# Patient Record
Sex: MALE | Race: WHITE | NOT HISPANIC OR LATINO | Employment: OTHER | URBAN - NONMETROPOLITAN AREA
[De-identification: names, ages, dates, MRNs, and addresses within clinical notes are randomized per-mention and may not be internally consistent; named-entity substitution may affect disease eponyms.]

---

## 2017-02-11 ENCOUNTER — HOSPITAL ENCOUNTER (EMERGENCY)
Facility: HOSPITAL | Age: 57
Discharge: HOME | End: 2017-02-11
Attending: EMERGENCY MEDICINE | Admitting: EMERGENCY MEDICINE

## 2017-02-11 ENCOUNTER — APPOINTMENT (OUTPATIENT)
Dept: GENERAL RADIOLOGY | Facility: HOSPITAL | Age: 57
End: 2017-02-11

## 2017-02-11 VITALS
RESPIRATION RATE: 18 BRPM | HEART RATE: 101 BPM | BODY MASS INDEX: 37.3 KG/M2 | TEMPERATURE: 98.9 F | HEIGHT: 75 IN | WEIGHT: 300 LBS | DIASTOLIC BLOOD PRESSURE: 65 MMHG | SYSTOLIC BLOOD PRESSURE: 122 MMHG | OXYGEN SATURATION: 88 %

## 2017-02-11 DIAGNOSIS — J44.1 COPD EXACERBATION (HCC): Primary | ICD-10-CM

## 2017-02-11 LAB
ALBUMIN SERPL-MCNC: 4.3 G/DL (ref 3.5–5)
ALBUMIN/GLOB SERPL: 1.3 G/DL (ref 1–2)
ALP SERPL-CCNC: 78 U/L (ref 38–126)
ALT SERPL W P-5'-P-CCNC: 38 U/L (ref 13–69)
ANION GAP SERPL CALCULATED.3IONS-SCNC: 12.2 MMOL/L
AST SERPL-CCNC: 23 U/L (ref 15–46)
BASOPHILS # BLD AUTO: 0.05 10*3/MM3 (ref 0–0.2)
BASOPHILS NFR BLD AUTO: 0.5 % (ref 0–2.5)
BILIRUB SERPL-MCNC: 0.9 MG/DL (ref 0.2–1.3)
BUN BLD-MCNC: 12 MG/DL (ref 7–20)
BUN/CREAT SERPL: 17.1 (ref 6.3–21.9)
CALCIUM SPEC-SCNC: 9.2 MG/DL (ref 8.4–10.2)
CHLORIDE SERPL-SCNC: 96 MMOL/L (ref 98–107)
CO2 SERPL-SCNC: 33 MMOL/L (ref 26–30)
CREAT BLD-MCNC: 0.7 MG/DL (ref 0.6–1.3)
DEPRECATED RDW RBC AUTO: 43 FL (ref 37–54)
EOSINOPHIL # BLD AUTO: 0.16 10*3/MM3 (ref 0–0.7)
EOSINOPHIL NFR BLD AUTO: 1.5 % (ref 0–7)
ERYTHROCYTE [DISTWIDTH] IN BLOOD BY AUTOMATED COUNT: 12.4 % (ref 11.5–14.5)
GFR SERPL CREATININE-BSD FRML MDRD: 116 ML/MIN/1.73
GLOBULIN UR ELPH-MCNC: 3.3 GM/DL
GLUCOSE BLD-MCNC: 125 MG/DL (ref 74–98)
HCT VFR BLD AUTO: 45.9 % (ref 42–52)
HGB BLD-MCNC: 15.7 G/DL (ref 14–18)
IMM GRANULOCYTES # BLD: 0.03 10*3/MM3 (ref 0–0.06)
IMM GRANULOCYTES NFR BLD: 0.3 % (ref 0–0.6)
LYMPHOCYTES # BLD AUTO: 1.05 10*3/MM3 (ref 0.6–3.4)
LYMPHOCYTES NFR BLD AUTO: 9.7 % (ref 10–50)
MCH RBC QN AUTO: 32.4 PG (ref 27–31)
MCHC RBC AUTO-ENTMCNC: 34.2 G/DL (ref 30–37)
MCV RBC AUTO: 94.8 FL (ref 80–94)
MONOCYTES # BLD AUTO: 1.01 10*3/MM3 (ref 0–0.9)
MONOCYTES NFR BLD AUTO: 9.4 % (ref 0–12)
NEUTROPHILS # BLD AUTO: 8.48 10*3/MM3 (ref 2–6.9)
NEUTROPHILS NFR BLD AUTO: 78.6 % (ref 37–80)
NRBC BLD MANUAL-RTO: 0 /100 WBC (ref 0–0)
NT-PROBNP SERPL-MCNC: 143 PG/ML (ref 0–125)
PLATELET # BLD AUTO: 207 10*3/MM3 (ref 130–400)
PMV BLD AUTO: 9.9 FL (ref 6–12)
POTASSIUM BLD-SCNC: 4.2 MMOL/L (ref 3.5–5.1)
PROT SERPL-MCNC: 7.6 G/DL (ref 6.3–8.2)
RBC # BLD AUTO: 4.84 10*6/MM3 (ref 4.7–6.1)
RBC MORPH BLD: NORMAL
SMALL PLATELETS BLD QL SMEAR: ADEQUATE
SODIUM BLD-SCNC: 137 MMOL/L (ref 137–145)
TROPONIN I SERPL-MCNC: 0.01 NG/ML (ref 0–0.05)
TROPONIN I SERPL-MCNC: <0.012 NG/ML (ref 0–0.03)
WBC MORPH BLD: NORMAL
WBC NRBC COR # BLD: 10.78 10*3/MM3 (ref 4.8–10.8)

## 2017-02-11 PROCEDURE — 93005 ELECTROCARDIOGRAM TRACING: CPT | Performed by: EMERGENCY MEDICINE

## 2017-02-11 PROCEDURE — 85007 BL SMEAR W/DIFF WBC COUNT: CPT | Performed by: EMERGENCY MEDICINE

## 2017-02-11 PROCEDURE — 71010 HC CHEST PA OR AP: CPT

## 2017-02-11 PROCEDURE — 80053 COMPREHEN METABOLIC PANEL: CPT | Performed by: EMERGENCY MEDICINE

## 2017-02-11 PROCEDURE — 85025 COMPLETE CBC W/AUTO DIFF WBC: CPT | Performed by: EMERGENCY MEDICINE

## 2017-02-11 PROCEDURE — 94640 AIRWAY INHALATION TREATMENT: CPT

## 2017-02-11 PROCEDURE — 96374 THER/PROPH/DIAG INJ IV PUSH: CPT

## 2017-02-11 PROCEDURE — 36415 COLL VENOUS BLD VENIPUNCTURE: CPT

## 2017-02-11 PROCEDURE — 83880 ASSAY OF NATRIURETIC PEPTIDE: CPT | Performed by: EMERGENCY MEDICINE

## 2017-02-11 PROCEDURE — 25010000002 METHYLPREDNISOLONE PER 125 MG: Performed by: EMERGENCY MEDICINE

## 2017-02-11 PROCEDURE — 84484 ASSAY OF TROPONIN QUANT: CPT | Performed by: EMERGENCY MEDICINE

## 2017-02-11 PROCEDURE — 99284 EMERGENCY DEPT VISIT MOD MDM: CPT

## 2017-02-11 RX ORDER — THEOPHYLLINE 400 MG/1
200 TABLET, EXTENDED RELEASE ORAL 2 TIMES DAILY
Status: ON HOLD | COMMUNITY
End: 2019-12-10

## 2017-02-11 RX ORDER — IPRATROPIUM BROMIDE AND ALBUTEROL SULFATE 2.5; .5 MG/3ML; MG/3ML
3 SOLUTION RESPIRATORY (INHALATION)
Status: COMPLETED | OUTPATIENT
Start: 2017-02-11 | End: 2017-02-11

## 2017-02-11 RX ORDER — ALBUTEROL SULFATE 2.5 MG/3ML
2.5 SOLUTION RESPIRATORY (INHALATION) EVERY 4 HOURS PRN
Qty: 30 VIAL | Refills: 0 | Status: ON HOLD | OUTPATIENT
Start: 2017-02-11 | End: 2019-12-12 | Stop reason: SDUPTHER

## 2017-02-11 RX ORDER — BUDESONIDE AND FORMOTEROL FUMARATE DIHYDRATE 160; 4.5 UG/1; UG/1
2 AEROSOL RESPIRATORY (INHALATION)
Status: ON HOLD | COMMUNITY
End: 2019-12-10

## 2017-02-11 RX ORDER — LEVOFLOXACIN 750 MG/1
750 TABLET ORAL DAILY
Qty: 5 TABLET | Refills: 0 | Status: SHIPPED | OUTPATIENT
Start: 2017-02-11 | End: 2017-02-16

## 2017-02-11 RX ORDER — METHYLPREDNISOLONE SODIUM SUCCINATE 125 MG/2ML
125 INJECTION, POWDER, LYOPHILIZED, FOR SOLUTION INTRAMUSCULAR; INTRAVENOUS ONCE
Status: COMPLETED | OUTPATIENT
Start: 2017-02-11 | End: 2017-02-11

## 2017-02-11 RX ORDER — BENZONATATE 100 MG/1
100 CAPSULE ORAL 3 TIMES DAILY PRN
Qty: 20 CAPSULE | Refills: 0 | Status: ON HOLD | OUTPATIENT
Start: 2017-02-11 | End: 2019-12-10

## 2017-02-11 RX ORDER — LISINOPRIL 20 MG/1
20 TABLET ORAL DAILY
Status: ON HOLD | COMMUNITY
End: 2019-12-10

## 2017-02-11 RX ORDER — BUPRENORPHINE HYDROCHLORIDE AND NALOXONE HYDROCHLORIDE DIHYDRATE 8; 2 MG/1; MG/1
1 TABLET SUBLINGUAL DAILY
Status: ON HOLD | COMMUNITY
End: 2019-12-10

## 2017-02-11 RX ORDER — PREDNISONE 20 MG/1
60 TABLET ORAL DAILY
Qty: 15 TABLET | Refills: 0 | Status: SHIPPED | OUTPATIENT
Start: 2017-02-11 | End: 2017-02-16

## 2017-02-11 RX ORDER — SODIUM CHLORIDE 0.9 % (FLUSH) 0.9 %
10 SYRINGE (ML) INJECTION AS NEEDED
Status: DISCONTINUED | OUTPATIENT
Start: 2017-02-11 | End: 2017-02-12 | Stop reason: HOSPADM

## 2017-02-11 RX ADMIN — IPRATROPIUM BROMIDE AND ALBUTEROL SULFATE 3 ML: .5; 3 SOLUTION RESPIRATORY (INHALATION) at 20:25

## 2017-02-11 RX ADMIN — IPRATROPIUM BROMIDE AND ALBUTEROL SULFATE 3 ML: .5; 3 SOLUTION RESPIRATORY (INHALATION) at 20:14

## 2017-02-11 RX ADMIN — METHYLPREDNISOLONE SODIUM SUCCINATE 125 MG: 125 INJECTION, POWDER, FOR SOLUTION INTRAMUSCULAR; INTRAVENOUS at 20:16

## 2017-02-11 RX ADMIN — IPRATROPIUM BROMIDE AND ALBUTEROL SULFATE 3 ML: .5; 3 SOLUTION RESPIRATORY (INHALATION) at 20:45

## 2017-02-12 LAB
HOLD SPECIMEN: NORMAL
HOLD SPECIMEN: NORMAL
WHOLE BLOOD HOLD SPECIMEN: NORMAL
WHOLE BLOOD HOLD SPECIMEN: NORMAL

## 2017-02-12 NOTE — ED PROVIDER NOTES
Subjective   HPI Comments: 57-year-old male presenting with shortness of breath.  He states that for the last 3 days he has had shortness of breath and chest tightness.  This is been associated with wheezing.  He has also been coughing, this is been productive of brown tinged sputum.  He denies any fevers, chills, nausea, vomiting, diarrhea, abdominal pain.  He continues to smoke.  He states his COPD flares up about this time every year.      Review of Systems   Constitutional: Negative for chills and fever.   HENT: Negative for congestion, rhinorrhea and sore throat.    Eyes: Negative for pain.   Respiratory: Positive for cough, chest tightness and shortness of breath.    Cardiovascular: Negative for chest pain, palpitations and leg swelling.   Gastrointestinal: Negative for abdominal pain, diarrhea, nausea and vomiting.   Genitourinary: Negative for dysuria.   Musculoskeletal: Negative for arthralgias.   Skin: Negative for rash.   Neurological: Negative for weakness and numbness.   Psychiatric/Behavioral: Negative for behavioral problems.       Past Medical History   Diagnosis Date   • Arthritis    • COPD (chronic obstructive pulmonary disease)    • Hypertension    • skin cancer    • Sleep apnea        Allergies   Allergen Reactions   • Corticosteroids Irritability   • Nsaids Anxiety       Past Surgical History   Procedure Laterality Date   • Adenoidectomy         History reviewed. No pertinent family history.    Social History     Social History   • Marital status:      Spouse name: N/A   • Number of children: N/A   • Years of education: N/A     Social History Main Topics   • Smoking status: Current Every Day Smoker   • Smokeless tobacco: None   • Alcohol use No      Comment: quit 20 years ago   • Drug use: Yes     Special: Marijuana      Comment: quit suboxone 2 years ago   • Sexual activity: Not Asked     Other Topics Concern   • None     Social History Narrative   • None           Objective   Physical  Exam   Constitutional: He is oriented to person, place, and time. He appears well-developed and well-nourished. No distress.   HENT:   Head: Normocephalic and atraumatic.   Right Ear: External ear normal.   Left Ear: External ear normal.   Nose: Nose normal.   Mouth/Throat: Oropharynx is clear and moist.   Eyes: Conjunctivae and EOM are normal. Pupils are equal, round, and reactive to light.   Neck: Normal range of motion. Neck supple.   Cardiovascular: Normal rate, regular rhythm, normal heart sounds and intact distal pulses.    Pulmonary/Chest:   Inspiratory and expiratory wheezes throughout both lung fields, coarse, no increased work of breathing   Abdominal: Soft. Bowel sounds are normal. He exhibits no distension. There is no tenderness. There is no rebound and no guarding.   Musculoskeletal: Normal range of motion. He exhibits no edema, tenderness or deformity.   Neurological: He is alert and oriented to person, place, and time.   Skin: Skin is warm and dry. No rash noted.   Psychiatric: He has a normal mood and affect. His behavior is normal.   Nursing note and vitals reviewed.      Procedures         ED Course  ED Course                  MDM  Number of Diagnoses or Management Options  COPD exacerbation:   Diagnosis management comments: 57-year-old male with chest tightness and shortness of breath.  Well-developed, well-nourished obese man in no distress with vital signs as above notable for initial hypoxia on room air.  He is likely having a flareup of his COPD.  We'll treat with nebulizers and steroids.  We'll check labs and chest x-ray.  Disposition pending workup and response to therapy.  -labs  -ekg  -cxr  -iv meds  -nebs    Ddx: copd, pna, bronchitis, acs, chf    EKG: Sinus rhythm, normal rate, normal axis/intervals, no ST changes, unchanged compared to previous    Workup here is unremarkable.  His chest x-ray is clear per my read.  He is feeling much better after treatment here, his lungs are clear.   His oxygen is around 88-94% on room air.  He would like to go home.  We'll discharge him against my recommendations.  Strict return precautions discussed.  He is comfortable with and understanding of the plan.       Amount and/or Complexity of Data Reviewed  Decide to obtain previous medical records or to obtain history from someone other than the patient: yes        Final diagnoses:   COPD exacerbation            Bro Albright MD  02/11/17 [id number]

## 2017-07-24 ENCOUNTER — TRANSCRIBE ORDERS (OUTPATIENT)
Dept: ULTRASOUND IMAGING | Facility: HOSPITAL | Age: 57
End: 2017-07-24

## 2017-07-24 DIAGNOSIS — I10 ESSENTIAL HYPERTENSION: Primary | ICD-10-CM

## 2017-07-24 DIAGNOSIS — G60.9 NEUROPATHY, PERIPHERAL, IDIOPATHIC: ICD-10-CM

## 2017-07-24 DIAGNOSIS — R60.0 BILATERAL LOWER EXTREMITY EDEMA: ICD-10-CM

## 2017-07-26 ENCOUNTER — HOSPITAL ENCOUNTER (OUTPATIENT)
Dept: ULTRASOUND IMAGING | Facility: HOSPITAL | Age: 57
Discharge: HOME | End: 2017-07-26
Admitting: NURSE PRACTITIONER

## 2017-07-26 DIAGNOSIS — I10 ESSENTIAL HYPERTENSION: ICD-10-CM

## 2017-07-26 DIAGNOSIS — R60.0 BILATERAL LOWER EXTREMITY EDEMA: ICD-10-CM

## 2017-07-26 DIAGNOSIS — G60.9 NEUROPATHY, PERIPHERAL, IDIOPATHIC: ICD-10-CM

## 2017-07-26 PROCEDURE — 93923 UPR/LXTR ART STDY 3+ LVLS: CPT

## 2017-11-09 ENCOUNTER — HOSPITAL ENCOUNTER (OUTPATIENT)
Dept: GENERAL RADIOLOGY | Facility: HOSPITAL | Age: 57
Discharge: HOME | End: 2017-11-09

## 2017-11-09 ENCOUNTER — HOSPITAL ENCOUNTER (OUTPATIENT)
Dept: GENERAL RADIOLOGY | Facility: HOSPITAL | Age: 57
Discharge: HOME | End: 2017-11-09
Admitting: NURSE PRACTITIONER

## 2017-11-09 ENCOUNTER — TRANSCRIBE ORDERS (OUTPATIENT)
Dept: GENERAL RADIOLOGY | Facility: HOSPITAL | Age: 57
End: 2017-11-09

## 2017-11-09 DIAGNOSIS — R52 PAIN: ICD-10-CM

## 2017-11-09 DIAGNOSIS — R52 PAIN: Primary | ICD-10-CM

## 2017-11-09 PROCEDURE — 73523 X-RAY EXAM HIPS BI 5/> VIEWS: CPT

## 2017-11-09 PROCEDURE — 72202 X-RAY EXAM SI JOINTS 3/> VWS: CPT

## 2017-11-09 PROCEDURE — 72100 X-RAY EXAM L-S SPINE 2/3 VWS: CPT

## 2018-05-15 ENCOUNTER — HOSPITAL ENCOUNTER (OUTPATIENT)
Dept: GENERAL RADIOLOGY | Facility: HOSPITAL | Age: 58
Discharge: HOME | End: 2018-05-15
Admitting: NURSE PRACTITIONER

## 2018-05-15 ENCOUNTER — HOSPITAL ENCOUNTER (OUTPATIENT)
Dept: GENERAL RADIOLOGY | Facility: HOSPITAL | Age: 58
Discharge: HOME | End: 2018-05-15

## 2018-05-15 ENCOUNTER — TRANSCRIBE ORDERS (OUTPATIENT)
Dept: GENERAL RADIOLOGY | Facility: HOSPITAL | Age: 58
End: 2018-05-15

## 2018-05-15 DIAGNOSIS — R52 PAIN: ICD-10-CM

## 2018-05-15 DIAGNOSIS — R52 PAIN: Primary | ICD-10-CM

## 2018-05-15 PROCEDURE — 73562 X-RAY EXAM OF KNEE 3: CPT

## 2018-06-19 ENCOUNTER — TRANSCRIBE ORDERS (OUTPATIENT)
Dept: ADMINISTRATIVE | Facility: HOSPITAL | Age: 58
End: 2018-06-19

## 2018-06-19 DIAGNOSIS — I73.9 INTERMITTENT CLAUDICATION (HCC): Primary | ICD-10-CM

## 2018-06-19 DIAGNOSIS — Z72.0 TOBACCO ABUSE: ICD-10-CM

## 2018-06-25 ENCOUNTER — HOSPITAL ENCOUNTER (OUTPATIENT)
Dept: ULTRASOUND IMAGING | Facility: HOSPITAL | Age: 58
Discharge: HOME | End: 2018-06-25
Admitting: NURSE PRACTITIONER

## 2018-06-25 DIAGNOSIS — I73.9 INTERMITTENT CLAUDICATION (HCC): ICD-10-CM

## 2018-06-25 DIAGNOSIS — Z72.0 TOBACCO ABUSE: ICD-10-CM

## 2018-06-25 PROCEDURE — 93923 UPR/LXTR ART STDY 3+ LVLS: CPT

## 2018-11-20 ENCOUNTER — OFFICE VISIT (OUTPATIENT)
Dept: ORTHOPEDIC SURGERY | Facility: CLINIC | Age: 58
End: 2018-11-20

## 2018-11-20 VITALS — BODY MASS INDEX: 37.42 KG/M2 | HEIGHT: 75 IN | WEIGHT: 301 LBS | RESPIRATION RATE: 18 BRPM

## 2018-11-20 DIAGNOSIS — M25.562 ARTHRALGIA OF KNEE, LEFT: Primary | ICD-10-CM

## 2018-11-20 DIAGNOSIS — M17.10 ARTHRITIS OF KNEE: ICD-10-CM

## 2018-11-20 PROCEDURE — 99204 OFFICE O/P NEW MOD 45 MIN: CPT | Performed by: ORTHOPAEDIC SURGERY

## 2018-11-20 PROCEDURE — 20610 DRAIN/INJ JOINT/BURSA W/O US: CPT | Performed by: ORTHOPAEDIC SURGERY

## 2018-11-20 RX ORDER — TRIAMCINOLONE ACETONIDE 40 MG/ML
40 INJECTION, SUSPENSION INTRA-ARTICULAR; INTRAMUSCULAR
Status: COMPLETED | OUTPATIENT
Start: 2018-11-20 | End: 2018-11-20

## 2018-11-20 RX ORDER — SITAGLIPTIN 100 MG/1
100 TABLET, FILM COATED ORAL DAILY
COMMUNITY
Start: 2018-09-01

## 2018-11-20 RX ORDER — FUROSEMIDE 40 MG/1
40 TABLET ORAL DAILY PRN
COMMUNITY
Start: 2018-11-09 | End: 2022-11-08

## 2018-11-20 RX ORDER — LIDOCAINE HYDROCHLORIDE 10 MG/ML
2 INJECTION, SOLUTION EPIDURAL; INFILTRATION; INTRACAUDAL; PERINEURAL
Status: COMPLETED | OUTPATIENT
Start: 2018-11-20 | End: 2018-11-20

## 2018-11-20 RX ORDER — GABAPENTIN 600 MG/1
800 TABLET ORAL 3 TIMES DAILY
COMMUNITY
Start: 2018-11-13 | End: 2022-11-08 | Stop reason: SDUPTHER

## 2018-11-20 RX ORDER — BUPRENORPHINE HYDROCHLORIDE, NALOXONE HYDROCHLORIDE 8; 2 MG/1; MG/1
FILM, SOLUBLE BUCCAL; SUBLINGUAL
Status: ON HOLD | COMMUNITY
Start: 2018-11-07 | End: 2019-12-10

## 2018-11-20 RX ORDER — TESTOSTERONE CYPIONATE 200 MG/ML
200 INJECTION, SOLUTION INTRAMUSCULAR
COMMUNITY
Start: 2018-09-14 | End: 2019-12-12 | Stop reason: HOSPADM

## 2018-11-20 RX ORDER — HYDROCHLOROTHIAZIDE 25 MG/1
25 TABLET ORAL DAILY
COMMUNITY
Start: 2018-10-16

## 2018-11-20 RX ORDER — POTASSIUM CHLORIDE 750 MG/1
10 CAPSULE, EXTENDED RELEASE ORAL DAILY PRN
COMMUNITY
Start: 2018-11-09

## 2018-11-20 RX ADMIN — LIDOCAINE HYDROCHLORIDE 2 ML: 10 INJECTION, SOLUTION EPIDURAL; INFILTRATION; INTRACAUDAL; PERINEURAL at 16:03

## 2018-11-20 RX ADMIN — TRIAMCINOLONE ACETONIDE 40 MG: 40 INJECTION, SUSPENSION INTRA-ARTICULAR; INTRAMUSCULAR at 16:03

## 2018-11-20 NOTE — PROGRESS NOTES
Subjective   Patient ID: Stephane Jensen is a 58 y.o. 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 His pain is 8/10 in the left knee and 4/10 in the right.) and Pain of the Right Knee             History of Present Illness  58-year-old currently retired former  with bilateral knee pain left worse than right denies acute trauma, pain is worse in the medial side of the left and right knees with weightbearing twisting standing bending, feels worse pain at the end of the day after standing for long periods of time.   Recently diagnosed with type 2 diabetes has history of COPD and poor venous circulation of both lower legs which is chronic.  Denies recent calf pain increase in size of his ankles or associated back or hip pain.  Has history of undergoing therapy in the past with minimal improvement.  Denies injection to either knee in the recent past.  Denies history of prior surgery either knee.      Review of Systems   Constitutional: Negative for fever.   HENT: Negative for voice change.    Eyes: Negative for visual disturbance.   Respiratory: Negative for shortness of breath.    Cardiovascular: Negative for chest pain.   Gastrointestinal: Negative for abdominal distention and abdominal pain.   Genitourinary: Negative for dysuria.   Musculoskeletal: Positive for arthralgias. Negative for gait problem and joint swelling.   Skin: Negative for rash.   Neurological: Negative for speech difficulty.   Hematological: Does not bruise/bleed easily.   Psychiatric/Behavioral: Negative for confusion.       Past Medical History:   Diagnosis Date   • Arthritis    • COPD (chronic obstructive pulmonary disease) (CMS/HCC)    • Hypertension    • skin cancer    • Sleep apnea         Past Surgical History:   Procedure Laterality Date   •  ADENOIDECTOMY         History reviewed. No pertinent family history.    Social History     Socioeconomic History   • Marital status:      Spouse name: Not on file   • Number of children: Not on file   • Years of education: Not on file   • Highest education level: Not on file   Social Needs   • Financial resource strain: Not on file   • Food insecurity - worry: Not on file   • Food insecurity - inability: Not on file   • Transportation needs - medical: Not on file   • Transportation needs - non-medical: Not on file   Occupational History   • Not on file   Tobacco Use   • Smoking status: Current Every Day Smoker   • Smokeless tobacco: Never Used   Substance and Sexual Activity   • Alcohol use: No     Comment: quit 20 years ago   • Drug use: Yes     Types: Marijuana     Comment: quit suboxone 2 years ago   • Sexual activity: Defer   Other Topics Concern   • Not on file   Social History Narrative   • Not on file       I have reviewed all of the above social hx, family hx, surgical hx, medications, allergies & ROS and confirm that it is accurate.    Allergies   Allergen Reactions   • Corticosteroids Irritability   • Nsaids Anxiety         Current Outpatient Medications:   •  albuterol (PROVENTIL) (2.5 MG/3ML) 0.083% nebulizer solution, Take 2.5 mg by nebulization Every 4 (Four) Hours As Needed for wheezing., Disp: 30 vial, Rfl: 0  •  benzonatate (TESSALON) 100 MG capsule, Take 1 capsule by mouth 3 (Three) Times a Day As Needed for cough., Disp: 20 capsule, Rfl: 0  •  BREO ELLIPTA 200-25 MCG/INH aerosol powder  inhaler, , Disp: , Rfl:   •  budesonide-formoterol (SYMBICORT) 160-4.5 MCG/ACT inhaler, Inhale 2 puffs 2 (Two) Times a Day., Disp: , Rfl:   •  buprenorphine-naloxone (SUBOXONE) 8-2 MG per SL tablet, Place 1 tablet under the tongue Daily., Disp: , Rfl:   •  furosemide (LASIX) 40 MG tablet, , Disp: , Rfl:   •  gabapentin (NEURONTIN) 600 MG tablet, , Disp: , Rfl:   •  hydrochlorothiazide (HYDRODIURIL) 25 MG  "tablet, , Disp: , Rfl:   •  INCRUSE ELLIPTA 62.5 MCG/INH aerosol powder , , Disp: , Rfl:   •  JANUVIA 100 MG tablet, , Disp: , Rfl:   •  lisinopril (PRINIVIL,ZESTRIL) 20 MG tablet, Take 20 mg by mouth Daily., Disp: , Rfl:   •  potassium chloride (MICRO-K) 10 MEQ CR capsule, , Disp: , Rfl:   •  PROAIR  (90 Base) MCG/ACT inhaler, , Disp: , Rfl:   •  sertraline (ZOLOFT) 50 MG tablet, , Disp: , Rfl:   •  SUBOXONE 8-2 MG film film, , Disp: , Rfl:   •  Testosterone Cypionate (DEPOTESTOTERONE CYPIONATE) 200 MG/ML injection, , Disp: , Rfl:   •  theophylline (UNIPHYL) 400 MG 24 hr tablet, Take 200 mg by mouth 2 (Two) Times a Day., Disp: , Rfl:   •  tiotropium (SPIRIVA) 18 MCG per inhalation capsule, Place 1 capsule into inhaler and inhale Daily., Disp: , Rfl:     Objective   Resp 18   Ht 189.2 cm (74.5\")   Wt (!) 137 kg (301 lb)   BMI 38.13 kg/m²    Physical Exam  Constitutional: Patient is oriented to person, place, and time. Patient appears well-developed and well-nourished.   HENT:Head: Normocephalic and atraumatic.   Eyes: EOM are normal. Pupils are equal, round, and reactive to light.   Neck: Normal range of motion. Neck supple.   Cardiovascular: Normal rate.    Pulmonary/Chest: Effort normal and breath sounds normal.   Abdominal: Soft.   Neurological: Patient is alert and oriented to person, place, and time.   Skin: Skin is warm and dry.   Psychiatric: Patient has a normal mood and affect.   Nursing note and vitals reviewed.       [unfilled]   Left knee: 1-2+ effusion, loss of extension 8°, flexion 110, good stability, chronic venous stasis changes in the left lower ankle no calf tenderness Homans sign negative extensor mechanism intact normal patellofemoral crepitus positive pain to medial and lateral joint line.    Right knee: No effusion full extension flexion 1:30 good stability chronic venous stasis changes in the right lower ankle with no calf tenderness Homans sign negative extensor mechanism " intact.    Assessment/Plan   Review of Radiographic Studies:    Indication to evaluate joint condition, no comparison views available, shows evident chronic advanced osteoarthritis.      Large Joint Arthrocentesis: L knee  Date/Time: 11/20/2018 4:03 PM  Consent given by: patient  Site marked: site marked  Timeout: Immediately prior to procedure a time out was called to verify the correct patient, procedure, equipment, support staff and site/side marked as required   Supporting Documentation  Indications: pain   Procedure Details  Location: knee - L knee  Preparation: Patient was prepped and draped in the usual sterile fashion  Needle size: 22 G  Medications administered: 2 mL lidocaine PF 1% 1 %; 40 mg triamcinolone acetonide 40 MG/ML  Patient tolerance: patient tolerated the procedure well with no immediate complications           Stephane was seen today for pain and pain.    Diagnoses and all orders for this visit:    Arthralgia of knee, left  -     XR Knee 1 or 2 View Left       Physical therapy referral given      Recommendations/Plan:   Exercise, medications, injections, other patient advice, and return appointment as noted.    Patient agreeable to call or return sooner for any concerns.       Discussed with him options of treatment for arthritis ranging from nonsurgical to surgical treatment options risks and complications pros and cons of each, also explained the nature of steroid injections versus gel injections with risks complications pros cons were discussed and explained      Impression:  Left worse than right tricompartmental osteoarthritis, history of diabetes, history of COPD, BMI 38  Plan:  Weight loss program, home exercise, if pain relief left knee not satisfactory return for repeat gel series injection to either or both knees depending on symptoms

## 2019-03-14 ENCOUNTER — OFFICE VISIT (OUTPATIENT)
Dept: ORTHOPEDIC SURGERY | Facility: CLINIC | Age: 59
End: 2019-03-14

## 2019-03-14 VITALS — RESPIRATION RATE: 18 BRPM | WEIGHT: 301 LBS | BODY MASS INDEX: 37.42 KG/M2 | HEIGHT: 75 IN

## 2019-03-14 DIAGNOSIS — M17.10 ARTHRITIS OF KNEE: ICD-10-CM

## 2019-03-14 DIAGNOSIS — M25.562 ARTHRALGIA OF KNEE, LEFT: Primary | ICD-10-CM

## 2019-03-14 PROCEDURE — 20610 DRAIN/INJ JOINT/BURSA W/O US: CPT | Performed by: ORTHOPAEDIC SURGERY

## 2019-03-14 NOTE — PROGRESS NOTES
"Subjective   Stephane Jensen is a 59 y.o. male here today for injection therapy.    Chief Complaint   Patient presents with   • Left Knee - Follow-up, Pain     Patient is here today for bilateral knee pain, he states the left is worse and the right hurts a lot. His pain level is 7/10 for the left and 6/10 for the right. He wants to discuss the gel injections.   • Right Knee - Follow-up, Pain       Past Medical History:   Diagnosis Date   • Arthritis    • COPD (chronic obstructive pulmonary disease) (CMS/HCC)    • Hypertension    • skin cancer    • Sleep apnea         Past Surgical History:   Procedure Laterality Date   • ADENOIDECTOMY         Allergies   Allergen Reactions   • Corticosteroids Irritability   • Nsaids Anxiety       Objective   Resp 18   Ht 189.2 cm (74.5\")   Wt (!) 137 kg (301 lb)   BMI 38.13 kg/m²    Physical Exam  Skin exam stable with no erythema, ecchymosis or rash. No new swelling. No motor or sensory changes. Distal pulse intact.    Large Joint Arthrocentesis: R knee  Date/Time: 3/14/2019 3:01 PM  Consent given by: patient  Site marked: site marked  Timeout: Immediately prior to procedure a time out was called to verify the correct patient, procedure, equipment, support staff and site/side marked as required   Supporting Documentation  Indications: pain   Procedure Details  Location: knee - R knee  Preparation: Patient was prepped and draped in the usual sterile fashion  Medications administered: 25 mg sodium hyaluronate 25 MG/2.5ML  Patient tolerance: patient tolerated the procedure well with no immediate complications    Large Joint Arthrocentesis: L knee  Date/Time: 3/14/2019 3:02 PM  Consent given by: patient  Site marked: site marked  Timeout: Immediately prior to procedure a time out was called to verify the correct patient, procedure, equipment, support staff and site/side marked as required   Supporting Documentation  Indications: pain   Procedure Details  Location: knee - L " knee  Preparation: Patient was prepped and draped in the usual sterile fashion  Medications administered: 25 mg sodium hyaluronate 25 MG/2.5ML  Patient tolerance: patient tolerated the procedure well with no immediate complications          Assessment/Plan     No diagnosis found.    No complications of injection noted.    Discussion of orthopaedic goals and activities and patient and/or guardian expressed appreciation. Call or notify for any adverse effect from injection therapy. Ice, heat, and/or modalities as beneficial. Watch for signs and symptoms of infection.    Recommendations:  Work/Activity Status: May perform usual activities as tolerated. May return to routine exercise and physical work as tolerated. No strenuous activity.  Patient and/or guardian is encouraged to call or return for any issues or concerns.    No Follow-up on file.  Patient agreeable to call or return sooner for any concerns.

## 2019-03-14 NOTE — PROGRESS NOTES
Subjective   Patient ID: Stephane Jensen is a 59 y.o. male  Follow-up and Pain of the Left Knee (Patient is here today for bilateral knee pain, he states the left is worse and the right hurts a lot. His pain level is 7/10 for the left and 6/10 for the right. He wants to discuss the gel injections.) and Follow-up and Pain of the Right Knee             History of Present Illness        Review of Systems   Constitutional: Negative for fever.   HENT: Negative for voice change.    Eyes: Negative for visual disturbance.   Respiratory: Negative for shortness of breath.    Cardiovascular: Negative for chest pain.   Gastrointestinal: Negative for abdominal distention and abdominal pain.   Genitourinary: Negative for dysuria.   Musculoskeletal: Positive for arthralgias. Negative for gait problem and joint swelling.   Skin: Negative for rash.   Neurological: Negative for speech difficulty.   Hematological: Does not bruise/bleed easily.   Psychiatric/Behavioral: Negative for confusion.       Past Medical History:   Diagnosis Date   • Arthritis    • COPD (chronic obstructive pulmonary disease) (CMS/[facility])    • Hypertension    • skin cancer    • Sleep apnea         Past Surgical History:   Procedure Laterality Date   • ADENOIDECTOMY         History reviewed. No pertinent family history.    Social History     Socioeconomic History   • Marital status:      Spouse name: Not on file   • Number of children: Not on file   • Years of education: Not on file   • Highest education level: Not on file   Social Needs   • Financial resource strain: Not on file   • Food insecurity - worry: Not on file   • Food insecurity - inability: Not on file   • Transportation needs - medical: Not on file   • Transportation needs - non-medical: Not on file   Occupational History   • Not on file   Tobacco Use   • Smoking status: Current Every Day Smoker   • Smokeless tobacco: Never Used   Substance and Sexual Activity   • Alcohol use: No     Comment:  "quit 20 years ago   • Drug use: Yes     Types: Marijuana     Comment: quit suboxone 2 years ago   • Sexual activity: Defer   Other Topics Concern   • Not on file   Social History Narrative   • Not on file       {Hx REVIEW:82332::\"I have reviewed all of the above social hx, family hx, surgical hx, medications, allergies & ROS and confirm that it is accurate.\"}    Allergies   Allergen Reactions   • Corticosteroids Irritability   • Nsaids Anxiety         Current Outpatient Medications:   •  albuterol (PROVENTIL) (2.5 MG/3ML) 0.083% nebulizer solution, Take 2.5 mg by nebulization Every 4 (Four) Hours As Needed for wheezing., Disp: 30 vial, Rfl: 0  •  benzonatate (TESSALON) 100 MG capsule, Take 1 capsule by mouth 3 (Three) Times a Day As Needed for cough., Disp: 20 capsule, Rfl: 0  •  BREO ELLIPTA 200-25 MCG/INH aerosol powder  inhaler, , Disp: , Rfl:   •  budesonide-formoterol (SYMBICORT) 160-4.5 MCG/ACT inhaler, Inhale 2 puffs 2 (Two) Times a Day., Disp: , Rfl:   •  buprenorphine-naloxone (SUBOXONE) 8-2 MG per SL tablet, Place 1 tablet under the tongue Daily., Disp: , Rfl:   •  furosemide (LASIX) 40 MG tablet, , Disp: , Rfl:   •  gabapentin (NEURONTIN) 600 MG tablet, , Disp: , Rfl:   •  hydrochlorothiazide (HYDRODIURIL) 25 MG tablet, , Disp: , Rfl:   •  INCRUSE ELLIPTA 62.5 MCG/INH aerosol powder , , Disp: , Rfl:   •  JANUVIA 100 MG tablet, , Disp: , Rfl:   •  lisinopril (PRINIVIL,ZESTRIL) 20 MG tablet, Take 20 mg by mouth Daily., Disp: , Rfl:   •  potassium chloride (MICRO-K) 10 MEQ CR capsule, , Disp: , Rfl:   •  PROAIR  (90 Base) MCG/ACT inhaler, , Disp: , Rfl:   •  sertraline (ZOLOFT) 50 MG tablet, , Disp: , Rfl:   •  SUBOXONE 8-2 MG film film, , Disp: , Rfl:   •  Testosterone Cypionate (DEPOTESTOTERONE CYPIONATE) 200 MG/ML injection, , Disp: , Rfl:   •  theophylline (UNIPHYL) 400 MG 24 hr tablet, Take 200 mg by mouth 2 (Two) Times a Day., Disp: , Rfl:   •  tiotropium (SPIRIVA) 18 MCG per inhalation capsule, " "Place 1 capsule into inhaler and inhale Daily., Disp: , Rfl:     Objective   Resp 18   Ht 189.2 cm (74.5\")   Wt (!) 137 kg (301 lb)   BMI 38.13 kg/m²    Physical Exam  Constitutional: Patient is oriented to person, place, and time. Patient appears well-developed and well-nourished.   HENT:Head: Normocephalic and atraumatic.   Eyes: EOM are normal. Pupils are equal, round, and reactive to light.   Neck: Normal range of motion. Neck supple.   Cardiovascular: Normal rate.    Pulmonary/Chest: Effort normal and breath sounds normal.   Abdominal: Soft.   Neurological: Patient is alert and oriented to person, place, and time.   Skin: Skin is warm and dry.   Psychiatric: Patient has a normal mood and affect.   Nursing note and vitals reviewed.       [unfilled]       Assessment/Plan   Review of Radiographic Studies:    {RN Imagin::\"No new imaging done today.\"}      Procedures     There are no diagnoses linked to this encounter.   {16RN Patient Advice:34033::\"Physical therapy referral given\"}      Recommendations/Plan:   {16RN Recommendations/Plan:11801}    Patient agreeable to call or return sooner for any concerns.             Impression:    Plan:    "

## 2019-03-21 ENCOUNTER — OFFICE VISIT (OUTPATIENT)
Dept: ORTHOPEDIC SURGERY | Facility: CLINIC | Age: 59
End: 2019-03-21

## 2019-03-21 VITALS — HEIGHT: 75 IN | WEIGHT: 301 LBS | BODY MASS INDEX: 37.42 KG/M2 | RESPIRATION RATE: 18 BRPM

## 2019-03-21 DIAGNOSIS — M25.561 ARTHRALGIA OF RIGHT KNEE: ICD-10-CM

## 2019-03-21 DIAGNOSIS — M25.562 ARTHRALGIA OF KNEE, LEFT: Primary | ICD-10-CM

## 2019-03-21 DIAGNOSIS — M17.10 ARTHRITIS OF KNEE: ICD-10-CM

## 2019-03-21 PROCEDURE — 20610 DRAIN/INJ JOINT/BURSA W/O US: CPT | Performed by: ORTHOPAEDIC SURGERY

## 2019-03-21 RX ORDER — GLIPIZIDE 5 MG/1
5 TABLET ORAL
COMMUNITY
Start: 2019-03-14

## 2019-03-21 NOTE — PROGRESS NOTES
"Subjective   Stephane Jensen is a 59 y.o. male here today for injection therapy.    Chief Complaint   Patient presents with   • Left Knee - Follow-up, Pain     Patient is here for supartz #2.   • Right Knee - Follow-up, Pain       Past Medical History:   Diagnosis Date   • Arthritis    • COPD (chronic obstructive pulmonary disease) (CMS/HCC)    • Hypertension    • skin cancer    • Sleep apnea         Past Surgical History:   Procedure Laterality Date   • ADENOIDECTOMY         Allergies   Allergen Reactions   • Corticosteroids Irritability   • Nsaids Anxiety       Objective   Resp 18   Ht 189.2 cm (74.5\")   Wt (!) 137 kg (301 lb)   BMI 38.13 kg/m²    Physical Exam  Skin exam stable with no erythema, ecchymosis or rash. No new swelling. No motor or sensory changes. Distal pulse intact.    Large Joint Arthrocentesis: R knee  Date/Time: 3/21/2019 10:23 AM  Consent given by: patient  Site marked: site marked  Timeout: Immediately prior to procedure a time out was called to verify the correct patient, procedure, equipment, support staff and site/side marked as required   Supporting Documentation  Indications: pain   Procedure Details  Location: knee - R knee  Preparation: Patient was prepped and draped in the usual sterile fashion  Needle size: 22 G  Medications administered: 25 mg sodium hyaluronate 25 MG/2.5ML  Patient tolerance: patient tolerated the procedure well with no immediate complications    Large Joint Arthrocentesis: L knee  Date/Time: 3/21/2019 10:24 AM  Consent given by: patient  Site marked: site marked  Timeout: Immediately prior to procedure a time out was called to verify the correct patient, procedure, equipment, support staff and site/side marked as required   Supporting Documentation  Indications: pain   Procedure Details  Location: knee - L knee  Preparation: Patient was prepped and draped in the usual sterile fashion  Needle size: 22 G  Medications administered: 25 mg sodium hyaluronate 25 " MG/2.5ML  Patient tolerance: patient tolerated the procedure well with no immediate complications          Assessment/Plan     No diagnosis found.    No complications of injection noted.    Discussion of orthopaedic goals and activities and patient and/or guardian expressed appreciation. Call or notify for any adverse effect from injection therapy. Ice, heat, and/or modalities as beneficial. Watch for signs and symptoms of infection.    Recommendations:  Work/Activity Status: May perform usual activities as tolerated. May return to routine exercise and physical work as tolerated. No strenuous activity.  Patient and/or guardian is encouraged to call or return for any issues or concerns.    No Follow-up on file.  Patient agreeable to call or return sooner for any concerns.

## 2019-03-28 ENCOUNTER — OFFICE VISIT (OUTPATIENT)
Dept: ORTHOPEDIC SURGERY | Facility: CLINIC | Age: 59
End: 2019-03-28

## 2019-03-28 VITALS — BODY MASS INDEX: 37.42 KG/M2 | RESPIRATION RATE: 18 BRPM | WEIGHT: 301 LBS | HEIGHT: 75 IN

## 2019-03-28 DIAGNOSIS — M17.10 ARTHRITIS OF KNEE: ICD-10-CM

## 2019-03-28 DIAGNOSIS — M25.561 ARTHRALGIA OF RIGHT KNEE: ICD-10-CM

## 2019-03-28 DIAGNOSIS — M25.562 ARTHRALGIA OF KNEE, LEFT: Primary | ICD-10-CM

## 2019-03-28 PROCEDURE — 20610 DRAIN/INJ JOINT/BURSA W/O US: CPT | Performed by: ORTHOPAEDIC SURGERY

## 2019-03-28 NOTE — PROGRESS NOTES
"Subjective   Stephane Jensen is a 59 y.o. male here today for injection therapy.    Chief Complaint   Patient presents with   • Left Knee - Follow-up, Pain     Patient is here today for his 3rd supartz injections.   • Right Knee - Follow-up, Pain       Past Medical History:   Diagnosis Date   • Arthritis    • COPD (chronic obstructive pulmonary disease) (CMS/HCC)    • Hypertension    • skin cancer    • Sleep apnea         Past Surgical History:   Procedure Laterality Date   • ADENOIDECTOMY         Allergies   Allergen Reactions   • Corticosteroids Irritability   • Nsaids Anxiety       Objective   Resp 18   Ht 189.2 cm (74.5\")   Wt (!) 137 kg (301 lb)   BMI 38.13 kg/m²    Physical Exam  Skin exam stable with no erythema, ecchymosis or rash. No new swelling. No motor or sensory changes. Distal pulse intact.    Large Joint Arthrocentesis: R knee  Date/Time: 3/28/2019 2:51 PM  Consent given by: patient  Site marked: site marked  Timeout: Immediately prior to procedure a time out was called to verify the correct patient, procedure, equipment, support staff and site/side marked as required   Supporting Documentation  Indications: pain   Procedure Details  Location: knee - R knee  Preparation: Patient was prepped and draped in the usual sterile fashion  Needle size: 22 G  Medications administered: 25 mg sodium hyaluronate 25 MG/2.5ML  Patient tolerance: patient tolerated the procedure well with no immediate complications    Large Joint Arthrocentesis: L knee  Date/Time: 3/28/2019 2:52 PM  Consent given by: patient  Site marked: site marked  Timeout: Immediately prior to procedure a time out was called to verify the correct patient, procedure, equipment, support staff and site/side marked as required   Supporting Documentation  Indications: pain   Procedure Details  Location: knee - L knee  Preparation: Patient was prepped and draped in the usual sterile fashion  Needle size: 22 G  Medications administered: 25 mg sodium " hyaluronate 25 MG/2.5ML  Patient tolerance: patient tolerated the procedure well with no immediate complications          Assessment/Plan      Diagnosis Plan   1. Arthralgia of knee, left     2. Arthralgia of right knee         No complications of injection noted.    Discussion of orthopaedic goals and activities and patient and/or guardian expressed appreciation. Call or notify for any adverse effect from injection therapy. Ice, heat, and/or modalities as beneficial. Watch for signs and symptoms of infection.    Recommendations:  Work/Activity Status: May perform usual activities as tolerated. May return to routine exercise and physical work as tolerated. No strenuous activity.  Patient and/or guardian is encouraged to call or return for any issues or concerns.    No Follow-up on file.  Patient agreeable to call or return sooner for any concerns.

## 2019-04-04 ENCOUNTER — OFFICE VISIT (OUTPATIENT)
Dept: ORTHOPEDIC SURGERY | Facility: CLINIC | Age: 59
End: 2019-04-04

## 2019-04-04 VITALS — RESPIRATION RATE: 18 BRPM | BODY MASS INDEX: 37.42 KG/M2 | HEIGHT: 75 IN | WEIGHT: 301 LBS

## 2019-04-04 DIAGNOSIS — M25.562 ARTHRALGIA OF KNEE, LEFT: Primary | ICD-10-CM

## 2019-04-04 DIAGNOSIS — M17.10 ARTHRITIS OF KNEE: ICD-10-CM

## 2019-04-04 DIAGNOSIS — M25.561 ARTHRALGIA OF RIGHT KNEE: ICD-10-CM

## 2019-04-04 PROCEDURE — 20610 DRAIN/INJ JOINT/BURSA W/O US: CPT | Performed by: ORTHOPAEDIC SURGERY

## 2019-04-04 NOTE — PROGRESS NOTES
"Subjective   Stephane Jensen is a 59 y.o. male here today for injection therapy.    Chief Complaint   Patient presents with   • Left Knee - Follow-up, Pain     Patient is here today for his 4th supartz injection.   • Right Knee - Follow-up, Pain       Past Medical History:   Diagnosis Date   • Arthritis    • COPD (chronic obstructive pulmonary disease) (CMS/HCC)    • Hypertension    • skin cancer    • Sleep apnea         Past Surgical History:   Procedure Laterality Date   • ADENOIDECTOMY         Allergies   Allergen Reactions   • Corticosteroids Irritability   • Nsaids Anxiety       Objective   Resp 18   Ht 189.2 cm (74.5\")   Wt (!) 137 kg (301 lb)   BMI 38.13 kg/m²    Physical Exam  Skin exam stable with no erythema, ecchymosis or rash. No new swelling. No motor or sensory changes. Distal pulse intact.    Large Joint Arthrocentesis: R knee  Date/Time: 4/4/2019 2:14 PM  Consent given by: patient  Site marked: site marked  Timeout: Immediately prior to procedure a time out was called to verify the correct patient, procedure, equipment, support staff and site/side marked as required   Supporting Documentation  Indications: pain   Procedure Details  Location: knee - R knee  Preparation: Patient was prepped and draped in the usual sterile fashion  Needle size: 22 G  Medications administered: 25 mg sodium hyaluronate 25 MG/2.5ML  Patient tolerance: patient tolerated the procedure well with no immediate complications    Large Joint Arthrocentesis: L knee  Date/Time: 4/4/2019 2:15 PM  Consent given by: patient  Site marked: site marked  Timeout: Immediately prior to procedure a time out was called to verify the correct patient, procedure, equipment, support staff and site/side marked as required   Supporting Documentation  Indications: pain   Procedure Details  Location: knee - L knee  Preparation: Patient was prepped and draped in the usual sterile fashion  Needle size: 22 G  Medications administered: 25 mg sodium " hyaluronate 25 MG/2.5ML  Patient tolerance: patient tolerated the procedure well with no immediate complications          Assessment/Plan      Diagnosis Plan   1. Arthralgia of knee, left     2. Arthralgia of right knee         No complications of injection noted.    Discussion of orthopaedic goals and activities and patient and/or guardian expressed appreciation. Call or notify for any adverse effect from injection therapy. Ice, heat, and/or modalities as beneficial. Watch for signs and symptoms of infection.    Recommendations:  Work/Activity Status: May perform usual activities as tolerated. May return to routine exercise and physical work as tolerated. No strenuous activity.  Patient and/or guardian is encouraged to call or return for any issues or concerns.    No Follow-up on file.  Patient agreeable to call or return sooner for any concerns.

## 2019-04-15 ENCOUNTER — OFFICE VISIT (OUTPATIENT)
Dept: ORTHOPEDIC SURGERY | Facility: CLINIC | Age: 59
End: 2019-04-15

## 2019-04-15 VITALS — WEIGHT: 288 LBS | BODY MASS INDEX: 35.81 KG/M2 | HEIGHT: 75 IN | RESPIRATION RATE: 18 BRPM

## 2019-04-15 DIAGNOSIS — M25.562 ARTHRALGIA OF KNEE, LEFT: Primary | ICD-10-CM

## 2019-04-15 DIAGNOSIS — M17.10 ARTHRITIS OF KNEE: ICD-10-CM

## 2019-04-15 DIAGNOSIS — M25.561 ARTHRALGIA OF RIGHT KNEE: ICD-10-CM

## 2019-04-15 PROCEDURE — 20610 DRAIN/INJ JOINT/BURSA W/O US: CPT | Performed by: ORTHOPAEDIC SURGERY

## 2019-04-15 RX ORDER — DIAZEPAM 10 MG/1
10 TABLET ORAL 2 TIMES DAILY
Refills: 0 | COMMUNITY
Start: 2019-04-12 | End: 2021-01-21

## 2019-04-15 RX ORDER — SERTRALINE HYDROCHLORIDE 100 MG/1
100 TABLET, FILM COATED ORAL DAILY
COMMUNITY
Start: 2019-04-12

## 2019-04-15 NOTE — PROGRESS NOTES
"Subjective   Stephane Jensen is a 59 y.o. male here today for injection therapy.    Chief Complaint   Patient presents with   • Left Knee - Follow-up, Pain     Patient is here today for his last supartz injection.   • Right Knee - Follow-up, Pain       Past Medical History:   Diagnosis Date   • Arthritis    • COPD (chronic obstructive pulmonary disease) (CMS/HCC)    • Hypertension    • skin cancer    • Sleep apnea         Past Surgical History:   Procedure Laterality Date   • ADENOIDECTOMY         Allergies   Allergen Reactions   • Corticosteroids Irritability   • Nsaids Anxiety       Objective   Resp 18   Ht 189.2 cm (74.5\")   Wt 131 kg (288 lb)   BMI 36.48 kg/m²    Physical Exam  Skin exam stable with no erythema, ecchymosis or rash. No new swelling. No motor or sensory changes. Distal pulse intact.    Large Joint Arthrocentesis: R knee  Date/Time: 4/15/2019 3:57 PM  Consent given by: patient  Site marked: site marked  Timeout: Immediately prior to procedure a time out was called to verify the correct patient, procedure, equipment, support staff and site/side marked as required   Supporting Documentation  Indications: pain   Procedure Details  Location: knee - R knee  Preparation: Patient was prepped and draped in the usual sterile fashion  Needle size: 22 G  Medications administered: 25 mg sodium hyaluronate 25 MG/2.5ML  Patient tolerance: patient tolerated the procedure well with no immediate complications    Large Joint Arthrocentesis: L knee  Date/Time: 4/15/2019 3:58 PM  Consent given by: patient  Site marked: site marked  Timeout: Immediately prior to procedure a time out was called to verify the correct patient, procedure, equipment, support staff and site/side marked as required   Supporting Documentation  Indications: pain   Procedure Details  Location: knee - L knee  Preparation: Patient was prepped and draped in the usual sterile fashion  Needle size: 22 G  Medications administered: 25 mg sodium " hyaluronate 25 MG/2.5ML  Patient tolerance: patient tolerated the procedure well with no immediate complications          Assessment/Plan      Diagnosis Plan   1. Arthralgia of knee, left  Large Joint Arthrocentesis: R knee    Large Joint Arthrocentesis: L knee   2. Arthralgia of right knee     3. Arthritis of knee         No complications of injection noted.    Discussion of orthopaedic goals and activities and patient and/or guardian expressed appreciation. Call or notify for any adverse effect from injection therapy. Ice, heat, and/or modalities as beneficial. Watch for signs and symptoms of infection.    Recommendations:  Work/Activity Status: May perform usual activities as tolerated. May return to routine exercise and physical work as tolerated. No strenuous activity.  Patient and/or guardian is encouraged to call or return for any issues or concerns.    No Follow-up on file.  Patient agreeable to call or return sooner for any concerns.

## 2019-12-10 ENCOUNTER — APPOINTMENT (OUTPATIENT)
Dept: GENERAL RADIOLOGY | Facility: HOSPITAL | Age: 59
End: 2019-12-10

## 2019-12-10 ENCOUNTER — APPOINTMENT (OUTPATIENT)
Dept: CARDIOLOGY | Facility: HOSPITAL | Age: 59
End: 2019-12-10

## 2019-12-10 ENCOUNTER — HOSPITAL ENCOUNTER (INPATIENT)
Facility: HOSPITAL | Age: 59
LOS: 1 days | Discharge: HOME | End: 2019-12-12
Attending: EMERGENCY MEDICINE | Admitting: FAMILY MEDICINE

## 2019-12-10 DIAGNOSIS — R09.02 HYPOXIA: ICD-10-CM

## 2019-12-10 DIAGNOSIS — G47.33 OSA AND COPD OVERLAP SYNDROME (HCC): Chronic | ICD-10-CM

## 2019-12-10 DIAGNOSIS — J44.9 OSA AND COPD OVERLAP SYNDROME (HCC): Chronic | ICD-10-CM

## 2019-12-10 DIAGNOSIS — J44.9 CHRONIC OBSTRUCTIVE PULMONARY DISEASE, UNSPECIFIED COPD TYPE (HCC): ICD-10-CM

## 2019-12-10 DIAGNOSIS — J44.1 COPD WITH ACUTE EXACERBATION (HCC): Primary | ICD-10-CM

## 2019-12-10 PROBLEM — D75.1 SECONDARY POLYCYTHEMIA: Status: ACTIVE | Noted: 2019-12-10

## 2019-12-10 PROBLEM — I10 ESSENTIAL HYPERTENSION: Status: ACTIVE | Noted: 2019-12-10

## 2019-12-10 PROBLEM — E11.9 TYPE 2 DIABETES MELLITUS, WITHOUT LONG-TERM CURRENT USE OF INSULIN (HCC): Status: ACTIVE | Noted: 2019-12-10

## 2019-12-10 LAB
A-A DO2: 25.2 MMHG
ALBUMIN SERPL-MCNC: 4.5 G/DL (ref 3.5–5.2)
ALBUMIN/GLOB SERPL: 1.4 G/DL
ALP SERPL-CCNC: 81 U/L (ref 39–117)
ALT SERPL W P-5'-P-CCNC: 13 U/L (ref 1–41)
ANION GAP SERPL CALCULATED.3IONS-SCNC: 12.6 MMOL/L (ref 5–15)
ARTERIAL PATENCY WRIST A: POSITIVE
AST SERPL-CCNC: 14 U/L (ref 1–40)
ATMOSPHERIC PRESS: 738 MMHG
BASE EXCESS BLDA CALC-SCNC: 7.6 MMOL/L (ref 0–2)
BASOPHILS # BLD AUTO: 0.09 10*3/MM3 (ref 0–0.2)
BASOPHILS NFR BLD AUTO: 1 % (ref 0–1.5)
BDY SITE: ABNORMAL
BILIRUB SERPL-MCNC: 0.9 MG/DL (ref 0.2–1.2)
BUN BLD-MCNC: 7 MG/DL (ref 8–23)
BUN/CREAT SERPL: 8.5 (ref 7–25)
CALCIUM SPEC-SCNC: 9.7 MG/DL (ref 8.6–10.5)
CHLORIDE SERPL-SCNC: 93 MMOL/L (ref 98–107)
CO2 SERPL-SCNC: 29.4 MMOL/L (ref 22–29)
COHGB MFR BLD: 3.9 % (ref 0–2)
CREAT BLD-MCNC: 0.82 MG/DL (ref 0.76–1.27)
DEPRECATED RDW RBC AUTO: 46.3 FL (ref 37–54)
EOSINOPHIL # BLD AUTO: 0.1 10*3/MM3 (ref 0–0.4)
EOSINOPHIL NFR BLD AUTO: 1.1 % (ref 0.3–6.2)
ERYTHROCYTE [DISTWIDTH] IN BLOOD BY AUTOMATED COUNT: 12.9 % (ref 12.3–15.4)
GAS FLOW AIRWAY: 2 LPM
GFR SERPL CREATININE-BSD FRML MDRD: 96 ML/MIN/1.73
GLOBULIN UR ELPH-MCNC: 3.2 GM/DL
GLUCOSE BLD-MCNC: 154 MG/DL (ref 65–99)
GLUCOSE BLDC GLUCOMTR-MCNC: 181 MG/DL (ref 70–130)
GLUCOSE BLDC GLUCOMTR-MCNC: 255 MG/DL (ref 70–130)
HCO3 BLDA-SCNC: 34.3 MMOL/L (ref 22–28)
HCT VFR BLD AUTO: 56.5 % (ref 37.5–51)
HCT VFR BLD CALC: 61.2 %
HGB BLD-MCNC: 20.1 G/DL (ref 13–17.7)
HGB BLDA-MCNC: 20 G/DL (ref 12–18)
HOLD SPECIMEN: NORMAL
HOLD SPECIMEN: NORMAL
IMM GRANULOCYTES # BLD AUTO: 0.03 10*3/MM3 (ref 0–0.05)
IMM GRANULOCYTES NFR BLD AUTO: 0.3 % (ref 0–0.5)
LYMPHOCYTES # BLD AUTO: 1.76 10*3/MM3 (ref 0.7–3.1)
LYMPHOCYTES NFR BLD AUTO: 20 % (ref 19.6–45.3)
MAGNESIUM SERPL-MCNC: 1.8 MG/DL (ref 1.6–2.4)
MCH RBC QN AUTO: 34.4 PG (ref 26.6–33)
MCHC RBC AUTO-ENTMCNC: 35.6 G/DL (ref 31.5–35.7)
MCV RBC AUTO: 96.6 FL (ref 79–97)
METHGB BLD QL: 0.6 % (ref 0–1.5)
MODALITY: ABNORMAL
MONOCYTES # BLD AUTO: 0.86 10*3/MM3 (ref 0.1–0.9)
MONOCYTES NFR BLD AUTO: 9.8 % (ref 5–12)
NEUTROPHILS # BLD AUTO: 5.96 10*3/MM3 (ref 1.7–7)
NEUTROPHILS NFR BLD AUTO: 67.8 % (ref 42.7–76)
NOTE: ABNORMAL
NRBC BLD AUTO-RTO: 0 /100 WBC (ref 0–0.2)
NT-PROBNP SERPL-MCNC: 105.1 PG/ML (ref 5–900)
OXYHGB MFR BLDV: 88 % (ref 94–99)
PCO2 BLDA: 51.7 MM HG (ref 35–45)
PCO2 TEMP ADJ BLD: ABNORMAL MM[HG]
PH BLDA: 7.43 PH UNITS (ref 7.3–7.5)
PH, TEMP CORRECTED: ABNORMAL
PLATELET # BLD AUTO: 200 10*3/MM3 (ref 140–450)
PMV BLD AUTO: 9.6 FL (ref 6–12)
PO2 BLDA: 61.6 MM HG (ref 75–100)
PO2 TEMP ADJ BLD: ABNORMAL MM[HG]
POTASSIUM BLD-SCNC: 3.4 MMOL/L (ref 3.5–5.2)
PROCALCITONIN SERPL-MCNC: 0.04 NG/ML (ref 0.1–0.25)
PROT SERPL-MCNC: 7.7 G/DL (ref 6–8.5)
RBC # BLD AUTO: 5.85 10*6/MM3 (ref 4.14–5.8)
SAO2 % BLDCOA: 92.1 % (ref 94–100)
SODIUM BLD-SCNC: 135 MMOL/L (ref 136–145)
TROPONIN T SERPL-MCNC: <0.01 NG/ML (ref 0–0.03)
VENTILATOR MODE: ABNORMAL
WBC NRBC COR # BLD: 8.8 10*3/MM3 (ref 3.4–10.8)
WHOLE BLOOD HOLD SPECIMEN: NORMAL
WHOLE BLOOD HOLD SPECIMEN: NORMAL

## 2019-12-10 PROCEDURE — 94640 AIRWAY INHALATION TREATMENT: CPT

## 2019-12-10 PROCEDURE — 83880 ASSAY OF NATRIURETIC PEPTIDE: CPT

## 2019-12-10 PROCEDURE — 83735 ASSAY OF MAGNESIUM: CPT | Performed by: PHYSICIAN ASSISTANT

## 2019-12-10 PROCEDURE — 99284 EMERGENCY DEPT VISIT MOD MDM: CPT

## 2019-12-10 PROCEDURE — 82375 ASSAY CARBOXYHB QUANT: CPT

## 2019-12-10 PROCEDURE — 87070 CULTURE OTHR SPECIMN AEROBIC: CPT | Performed by: FAMILY MEDICINE

## 2019-12-10 PROCEDURE — 84484 ASSAY OF TROPONIN QUANT: CPT

## 2019-12-10 PROCEDURE — G0378 HOSPITAL OBSERVATION PER HR: HCPCS

## 2019-12-10 PROCEDURE — 25010000002 MAGNESIUM SULFATE 2 GM/50ML SOLUTION: Performed by: PHYSICIAN ASSISTANT

## 2019-12-10 PROCEDURE — 82805 BLOOD GASES W/O2 SATURATION: CPT

## 2019-12-10 PROCEDURE — 36600 WITHDRAWAL OF ARTERIAL BLOOD: CPT

## 2019-12-10 PROCEDURE — 94799 UNLISTED PULMONARY SVC/PX: CPT

## 2019-12-10 PROCEDURE — 85025 COMPLETE CBC W/AUTO DIFF WBC: CPT

## 2019-12-10 PROCEDURE — 25010000002 ENOXAPARIN PER 10 MG: Performed by: FAMILY MEDICINE

## 2019-12-10 PROCEDURE — 93306 TTE W/DOPPLER COMPLETE: CPT

## 2019-12-10 PROCEDURE — 63710000001 INSULIN ASPART PER 5 UNITS: Performed by: FAMILY MEDICINE

## 2019-12-10 PROCEDURE — 93005 ELECTROCARDIOGRAM TRACING: CPT

## 2019-12-10 PROCEDURE — 84145 PROCALCITONIN (PCT): CPT | Performed by: PHYSICIAN ASSISTANT

## 2019-12-10 PROCEDURE — 99222 1ST HOSP IP/OBS MODERATE 55: CPT | Performed by: FAMILY MEDICINE

## 2019-12-10 PROCEDURE — 25010000002 METHYLPREDNISOLONE PER 125 MG: Performed by: PHYSICIAN ASSISTANT

## 2019-12-10 PROCEDURE — 83050 HGB METHEMOGLOBIN QUAN: CPT

## 2019-12-10 PROCEDURE — 71046 X-RAY EXAM CHEST 2 VIEWS: CPT

## 2019-12-10 PROCEDURE — 80053 COMPREHEN METABOLIC PANEL: CPT

## 2019-12-10 PROCEDURE — 25010000002 METHYLPREDNISOLONE PER 125 MG: Performed by: FAMILY MEDICINE

## 2019-12-10 PROCEDURE — 82962 GLUCOSE BLOOD TEST: CPT

## 2019-12-10 RX ORDER — GUAIFENESIN 600 MG/1
600 TABLET, EXTENDED RELEASE ORAL EVERY 12 HOURS SCHEDULED
Status: DISCONTINUED | OUTPATIENT
Start: 2019-12-10 | End: 2019-12-12 | Stop reason: HOSPADM

## 2019-12-10 RX ORDER — BENZONATATE 100 MG/1
200 CAPSULE ORAL 3 TIMES DAILY PRN
Status: DISCONTINUED | OUTPATIENT
Start: 2019-12-10 | End: 2019-12-12 | Stop reason: HOSPADM

## 2019-12-10 RX ORDER — LISINOPRIL 10 MG/1
10 TABLET ORAL
Status: DISCONTINUED | OUTPATIENT
Start: 2019-12-10 | End: 2019-12-12 | Stop reason: HOSPADM

## 2019-12-10 RX ORDER — NICOTINE 21 MG/24HR
1 PATCH, TRANSDERMAL 24 HOURS TRANSDERMAL EVERY 24 HOURS
Status: DISCONTINUED | OUTPATIENT
Start: 2019-12-10 | End: 2019-12-12 | Stop reason: HOSPADM

## 2019-12-10 RX ORDER — METHYLPREDNISOLONE SODIUM SUCCINATE 125 MG/2ML
60 INJECTION, POWDER, LYOPHILIZED, FOR SOLUTION INTRAMUSCULAR; INTRAVENOUS EVERY 12 HOURS
Status: DISCONTINUED | OUTPATIENT
Start: 2019-12-10 | End: 2019-12-11

## 2019-12-10 RX ORDER — HYDROCHLOROTHIAZIDE 25 MG/1
25 TABLET ORAL DAILY
Status: DISCONTINUED | OUTPATIENT
Start: 2019-12-10 | End: 2019-12-12 | Stop reason: HOSPADM

## 2019-12-10 RX ORDER — METHYLPREDNISOLONE SODIUM SUCCINATE 125 MG/2ML
125 INJECTION, POWDER, LYOPHILIZED, FOR SOLUTION INTRAMUSCULAR; INTRAVENOUS ONCE
Status: COMPLETED | OUTPATIENT
Start: 2019-12-10 | End: 2019-12-10

## 2019-12-10 RX ORDER — SODIUM CHLORIDE FOR INHALATION 3 %
4 VIAL, NEBULIZER (ML) INHALATION ONCE
Status: COMPLETED | OUTPATIENT
Start: 2019-12-10 | End: 2019-12-10

## 2019-12-10 RX ORDER — DIAZEPAM 5 MG/1
10 TABLET ORAL 2 TIMES DAILY
Status: DISCONTINUED | OUTPATIENT
Start: 2019-12-10 | End: 2019-12-12 | Stop reason: HOSPADM

## 2019-12-10 RX ORDER — DOCUSATE SODIUM 100 MG/1
100 CAPSULE, LIQUID FILLED ORAL 2 TIMES DAILY PRN
COMMUNITY
End: 2021-01-21

## 2019-12-10 RX ORDER — GLIPIZIDE 5 MG/1
5 TABLET ORAL
Status: DISCONTINUED | OUTPATIENT
Start: 2019-12-11 | End: 2019-12-12 | Stop reason: HOSPADM

## 2019-12-10 RX ORDER — POTASSIUM CHLORIDE 750 MG/1
10 CAPSULE, EXTENDED RELEASE ORAL DAILY
Status: DISCONTINUED | OUTPATIENT
Start: 2019-12-10 | End: 2019-12-12 | Stop reason: HOSPADM

## 2019-12-10 RX ORDER — BUDESONIDE AND FORMOTEROL FUMARATE DIHYDRATE 160; 4.5 UG/1; UG/1
2 AEROSOL RESPIRATORY (INHALATION)
Status: DISCONTINUED | OUTPATIENT
Start: 2019-12-10 | End: 2019-12-12 | Stop reason: HOSPADM

## 2019-12-10 RX ORDER — NICOTINE POLACRILEX 4 MG
1 LOZENGE BUCCAL
Status: DISCONTINUED | OUTPATIENT
Start: 2019-12-10 | End: 2019-12-12 | Stop reason: HOSPADM

## 2019-12-10 RX ORDER — BUPRENORPHINE AND NALOXONE 8; 2 MG/1; MG/1
1.5 FILM, SOLUBLE BUCCAL; SUBLINGUAL DAILY
Status: DISCONTINUED | OUTPATIENT
Start: 2019-12-10 | End: 2019-12-10 | Stop reason: CLARIF

## 2019-12-10 RX ORDER — DOCUSATE SODIUM 100 MG/1
100 CAPSULE, LIQUID FILLED ORAL 2 TIMES DAILY PRN
Status: DISCONTINUED | OUTPATIENT
Start: 2019-12-10 | End: 2019-12-12 | Stop reason: HOSPADM

## 2019-12-10 RX ORDER — SODIUM CHLORIDE 0.9 % (FLUSH) 0.9 %
10 SYRINGE (ML) INJECTION AS NEEDED
Status: DISCONTINUED | OUTPATIENT
Start: 2019-12-10 | End: 2019-12-12 | Stop reason: HOSPADM

## 2019-12-10 RX ORDER — BUPRENORPHINE AND NALOXONE 4; 1 MG/1; MG/1
1 FILM, SOLUBLE BUCCAL; SUBLINGUAL DAILY
Status: DISCONTINUED | OUTPATIENT
Start: 2019-12-10 | End: 2019-12-12 | Stop reason: HOSPADM

## 2019-12-10 RX ORDER — DEXTROSE MONOHYDRATE 25 G/50ML
25 INJECTION, SOLUTION INTRAVENOUS
Status: DISCONTINUED | OUTPATIENT
Start: 2019-12-10 | End: 2019-12-12 | Stop reason: HOSPADM

## 2019-12-10 RX ORDER — BUPRENORPHINE AND NALOXONE 8; 2 MG/1; MG/1
1 FILM, SOLUBLE BUCCAL; SUBLINGUAL DAILY
Status: DISCONTINUED | OUTPATIENT
Start: 2019-12-10 | End: 2019-12-12 | Stop reason: HOSPADM

## 2019-12-10 RX ORDER — SODIUM CHLORIDE 9 MG/ML
100 INJECTION, SOLUTION INTRAVENOUS CONTINUOUS
Status: DISCONTINUED | OUTPATIENT
Start: 2019-12-10 | End: 2019-12-11

## 2019-12-10 RX ORDER — IPRATROPIUM BROMIDE AND ALBUTEROL SULFATE 2.5; .5 MG/3ML; MG/3ML
3 SOLUTION RESPIRATORY (INHALATION) ONCE
Status: COMPLETED | OUTPATIENT
Start: 2019-12-10 | End: 2019-12-10

## 2019-12-10 RX ORDER — MAGNESIUM SULFATE HEPTAHYDRATE 40 MG/ML
2 INJECTION, SOLUTION INTRAVENOUS ONCE
Status: COMPLETED | OUTPATIENT
Start: 2019-12-10 | End: 2019-12-10

## 2019-12-10 RX ORDER — GABAPENTIN 300 MG/1
600 CAPSULE ORAL EVERY 8 HOURS SCHEDULED
Status: DISCONTINUED | OUTPATIENT
Start: 2019-12-10 | End: 2019-12-12 | Stop reason: HOSPADM

## 2019-12-10 RX ORDER — IPRATROPIUM BROMIDE AND ALBUTEROL SULFATE 2.5; .5 MG/3ML; MG/3ML
3 SOLUTION RESPIRATORY (INHALATION)
Status: DISCONTINUED | OUTPATIENT
Start: 2019-12-10 | End: 2019-12-12 | Stop reason: HOSPADM

## 2019-12-10 RX ORDER — BUPRENORPHINE AND NALOXONE 8; 2 MG/1; MG/1
1 FILM, SOLUBLE BUCCAL; SUBLINGUAL DAILY
COMMUNITY

## 2019-12-10 RX ADMIN — INSULIN ASPART 4 UNITS: 100 INJECTION, SOLUTION INTRAVENOUS; SUBCUTANEOUS at 21:03

## 2019-12-10 RX ADMIN — SODIUM CHLORIDE, PRESERVATIVE FREE 10 ML: 5 INJECTION INTRAVENOUS at 20:01

## 2019-12-10 RX ADMIN — SODIUM CHLORIDE SOLN NEBU 3% 4 ML: 3 NEBU SOLN at 17:00

## 2019-12-10 RX ADMIN — BENZONATATE 200 MG: 100 CAPSULE ORAL at 23:44

## 2019-12-10 RX ADMIN — IPRATROPIUM BROMIDE AND ALBUTEROL SULFATE 3 ML: .5; 3 SOLUTION RESPIRATORY (INHALATION) at 19:43

## 2019-12-10 RX ADMIN — IPRATROPIUM BROMIDE AND ALBUTEROL SULFATE 3 ML: .5; 3 SOLUTION RESPIRATORY (INHALATION) at 12:35

## 2019-12-10 RX ADMIN — INSULIN ASPART 2 UNITS: 100 INJECTION, SOLUTION INTRAVENOUS; SUBCUTANEOUS at 17:25

## 2019-12-10 RX ADMIN — GUAIFENESIN 600 MG: 600 TABLET, EXTENDED RELEASE ORAL at 16:35

## 2019-12-10 RX ADMIN — BUPRENORPHINE HYDROCHLORIDE, NALOXONE HYDROCHLORIDE 1 FILM: 8; 2 FILM, SOLUBLE BUCCAL; SUBLINGUAL at 16:35

## 2019-12-10 RX ADMIN — POTASSIUM CHLORIDE 10 MEQ: 750 CAPSULE, EXTENDED RELEASE ORAL at 16:35

## 2019-12-10 RX ADMIN — METHYLPREDNISOLONE SODIUM SUCCINATE 60 MG: 125 INJECTION, POWDER, FOR SOLUTION INTRAMUSCULAR; INTRAVENOUS at 20:01

## 2019-12-10 RX ADMIN — SODIUM CHLORIDE 100 ML/HR: 9 INJECTION, SOLUTION INTRAVENOUS at 16:35

## 2019-12-10 RX ADMIN — IPRATROPIUM BROMIDE AND ALBUTEROL SULFATE 3 ML: .5; 3 SOLUTION RESPIRATORY (INHALATION) at 11:07

## 2019-12-10 RX ADMIN — DOXYCYCLINE 100 MG: 100 INJECTION, POWDER, LYOPHILIZED, FOR SOLUTION INTRAVENOUS at 16:35

## 2019-12-10 RX ADMIN — BUPRENORPHINE HYDROCHLORIDE, NALOXONE HYDROCHLORIDE 1 FILM: 4; 1 FILM, SOLUBLE BUCCAL; SUBLINGUAL at 16:35

## 2019-12-10 RX ADMIN — IPRATROPIUM BROMIDE AND ALBUTEROL SULFATE 3 ML: .5; 3 SOLUTION RESPIRATORY (INHALATION) at 17:00

## 2019-12-10 RX ADMIN — LINAGLIPTIN 5 MG: 5 TABLET, FILM COATED ORAL at 16:35

## 2019-12-10 RX ADMIN — SODIUM CHLORIDE 1000 ML: 9 INJECTION, SOLUTION INTRAVENOUS at 11:55

## 2019-12-10 RX ADMIN — SERTRALINE HYDROCHLORIDE 100 MG: 50 TABLET ORAL at 16:35

## 2019-12-10 RX ADMIN — HYDROCHLOROTHIAZIDE 25 MG: 25 TABLET ORAL at 16:35

## 2019-12-10 RX ADMIN — ENOXAPARIN SODIUM 60 MG: 60 INJECTION SUBCUTANEOUS at 16:35

## 2019-12-10 RX ADMIN — BUDESONIDE AND FORMOTEROL FUMARATE DIHYDRATE 2 PUFF: 160; 4.5 AEROSOL RESPIRATORY (INHALATION) at 19:43

## 2019-12-10 RX ADMIN — DIAZEPAM 10 MG: 5 TABLET ORAL at 20:01

## 2019-12-10 RX ADMIN — IPRATROPIUM BROMIDE AND ALBUTEROL SULFATE 3 ML: .5; 3 SOLUTION RESPIRATORY (INHALATION) at 23:56

## 2019-12-10 RX ADMIN — GABAPENTIN 600 MG: 300 CAPSULE ORAL at 16:35

## 2019-12-10 RX ADMIN — LISINOPRIL 10 MG: 10 TABLET ORAL at 16:35

## 2019-12-10 RX ADMIN — NICOTINE 1 PATCH: 21 PATCH TRANSDERMAL at 16:35

## 2019-12-10 RX ADMIN — METHYLPREDNISOLONE SODIUM SUCCINATE 125 MG: 125 INJECTION, POWDER, FOR SOLUTION INTRAMUSCULAR; INTRAVENOUS at 11:55

## 2019-12-10 RX ADMIN — MAGNESIUM SULFATE HEPTAHYDRATE 2 G: 40 INJECTION, SOLUTION INTRAVENOUS at 13:36

## 2019-12-10 RX ADMIN — GABAPENTIN 600 MG: 300 CAPSULE ORAL at 21:03

## 2019-12-10 NOTE — H&P
[facility]   HISTORY AND PHYSICAL      Name:  Stephane Jensen   Age:  59 y.o.  Sex:  male  :  1960  MRN:  [id number]   Visit Number:  [id number]  Admission Date:  12/10/2019  Date Of Service:  12/10/19  Primary Care Physician:  Leticia Whittaker APRN    Chief Complaint:     Shortness of breath, sputum production    History Of Presenting Illness:      Patient is a 59-year-old gentleman with longstanding history of COPD and tobacco abuse who presents with complaints of shortness of breath and cough.  Patient with medical history of COPD, DOMINIQUE, obesity, hypertension, type 2 diabetes, substance abuse, and ongoing tobacco abuse.  Patient states that he has had shortness of breath for a long time, however over the last week he has had increased sputum production with green color in addition to significant shortness of breath.  He is very winded with minimal exertion.  Continues to smoke at least a pack of cigarettes a day.  He does have a history of substance abuse, he is on Suboxone therapy and follows with psychiatric services.  He denies chest pains or palpitations.  He denies lower extremity swelling.  He states he is on Lasix on a as needed basis.  He denies cardiac history.    In the ER, patient presented hypertensive, tachypneic, and afebrile.  He was satting in the upper 80s on room air and was placed on 2 L nasal cannula.  Procalcitonin 0.04, white blood cell count 8800, hemoglobin of 20, hematocrit 56.5.  Troponin negative.  proBNP 105.  Creatinine 0.82, potassium 3.4 ABG with pH 7.43, PCO2 51, PO2 of 61 on 2 L nasal cannula.  Chest x-ray without evidence of acute cardiopulmonary process, but with chronic interstitial lung changes.  Patient was given 2 nebulizer treatments, magnesium, IV Solu-Medrol, and placed on oxygen therapy.  We were asked admit for further management.    Review Of Systems:     General: Denies any fevers, chills or loss of consciousness.   Psych: No history  of any hallucinations and delusions.  Ophth: No history of any diplopia or transient loss of vision.  ENT: No history of sore throat, nasal congestion or ear pain.   Allergy/immuno: No history of rash or itching.  Hem/lymph: No history of neck swelling or easy bleeding.  Endo: No history of any recent unintentional weight gain or loss.  Resp: Positive cough and shortness of breath  Card: No history of chest pain or palpitations.   GI: No history of nausea, vomiting, diarrhea. Denies any abdominal pain.   : No history of dysuria or hematuria.  MSK: No muscle pain. No calf pain.   Neuro: No history of any focal weakness. No loss of consciousness. Denies any numbness.  Derm:: No history of any redness or pruritis.     Past Medical History:    Past Medical History:   Diagnosis Date   • Arthritis    • COPD (chronic obstructive pulmonary disease) (CMS/HCC)    • Hypertension    • skin cancer    • Sleep apnea        Past Surgical history:    Past Surgical History:   Procedure Laterality Date   • ADENOIDECTOMY         Social History:    Social History     Socioeconomic History   • Marital status:      Spouse name: Not on file   • Number of children: Not on file   • Years of education: Not on file   • Highest education level: Not on file   Tobacco Use   • Smoking status: Current Every Day Smoker   • Smokeless tobacco: Never Used   Substance and Sexual Activity   • Alcohol use: No     Comment: quit 20 years ago   • Drug use: Yes     Types: Marijuana     Comment: quit suboxone 2 years ago   • Sexual activity: Defer       Family History:    History reviewed. No pertinent family history.    Allergies:      Corticosteroids and Nsaids    Home Medications:    Prior to Admission Medications     Prescriptions Last Dose Informant Patient Reported? Taking?    albuterol (PROVENTIL) (2.5 MG/3ML) 0.083% nebulizer solution   No No    Take 2.5 mg by nebulization Every 4 (Four) Hours As Needed for wheezing.    benzonatate (TESSALON)  100 MG capsule   No No    Take 1 capsule by mouth 3 (Three) Times a Day As Needed for cough.    BREO ELLIPTA 200-25 MCG/INH aerosol powder  inhaler   Yes No    budesonide-formoterol (SYMBICORT) 160-4.5 MCG/ACT inhaler   Yes No    Inhale 2 puffs 2 (Two) Times a Day.    buprenorphine-naloxone (SUBOXONE) 8-2 MG per SL tablet   Yes No    Place 1 tablet under the tongue Daily.    diazePAM (VALIUM) 10 MG tablet   Yes No    TK 1/2-1 T PO BID PRN    furosemide (LASIX) 40 MG tablet   Yes No    gabapentin (NEURONTIN) 600 MG tablet   Yes No    glipiZIDE (GLUCOTROL) 5 MG tablet   Yes No    hydrochlorothiazide (HYDRODIURIL) 25 MG tablet   Yes No    INCRUSE ELLIPTA 62.5 MCG/INH aerosol powder    Yes No    JANUVIA 100 MG tablet   Yes No    lisinopril (PRINIVIL,ZESTRIL) 20 MG tablet   Yes No    Take 20 mg by mouth Daily.    potassium chloride (MICRO-K) 10 MEQ CR capsule   Yes No    PROAIR  (90 Base) MCG/ACT inhaler   Yes No    sertraline (ZOLOFT) 100 MG tablet   Yes No    SUBOXONE 8-2 MG film film   Yes No    Testosterone Cypionate (DEPOTESTOTERONE CYPIONATE) 200 MG/ML injection   Yes No    theophylline (UNIPHYL) 400 MG 24 hr tablet   Yes No    Take 200 mg by mouth 2 (Two) Times a Day.    tiotropium (SPIRIVA) 18 MCG per inhalation capsule   Yes No    Place 1 capsule into inhaler and inhale Daily.             ED Medications:    Medications   sodium chloride 0.9 % flush 10 mL (has no administration in time range)   ipratropium-albuterol (DUO-NEB) nebulizer solution 3 mL (3 mL Nebulization Given 12/10/19 1107)   methylPREDNISolone sodium succinate (SOLU-Medrol) injection 125 mg (125 mg Intravenous Given 12/10/19 1155)   sodium chloride 0.9 % bolus 1,000 mL (0 mL Intravenous Stopped 12/10/19 1337)   ipratropium-albuterol (DUO-NEB) nebulizer solution 3 mL (3 mL Nebulization Given 12/10/19 1235)   magnesium sulfate 2g/50 mL (PREMIX) infusion (0 g Intravenous Stopped 12/10/19 1414)       Vital Signs:    Temp:  [97.4 °F (36.3 °C)-98  °F (36.7 °C)] 97.4 °F (36.3 °C)  Heart Rate:  [61-98] 78  Resp:  [18-30] 22  BP: (147-168)/() 168/94        12/10/19  1034 12/10/19  1506   Weight: 125 kg (276 lb) 125 kg (274 lb 9.6 oz)       Body mass index is 34.32 kg/m².    Physical Exam:    General Appearance:  Alert and cooperative, not in any acute distress.   Head:  Atraumatic and normocephalic, without obvious abnormality.   Eyes:          PERRLA, conjunctivae and sclerae normal, no Icterus. No pallor. Extraocular movements are within normal limits.   Ears:  Ears appear intact with no abnormalities noted.   Throat: No oral lesions, no thrush, oral mucosa dry   Neck: Supple, trachea midline, no thyromegaly, no carotid bruit.   Back:   No tenderness to palpation, range of motion normal.   Lungs:    Diminished breath sounds bilaterally with diffuse expiratory wheezes in upper and lower lung zones.  Accessory muscle use noted.   Heart:  Normal S1 and S2, no murmur, no gallop, no rub. No JVD.   Abdomen:   Normal bowel sounds, no masses, no organomegaly. Soft, non-tender, non-distended, no guarding, no rebound tenderness.  Obese abdomen.   Extremities: Moves all extremities well, trace edema, no cyanosis, no clubbing.   Pulses: Pulses palpable and equal bilaterally.   Skin: No bleeding, bruising or rash.   Neurologic: Alert and oriented x 3. Moves all four limbs equally. No tremors. No facial asymmetry.     Laboratory data:    I have reviewed the labs done in the emergency room.    Results from last 7 days   Lab Units 12/10/19  1045   SODIUM mmol/L 135*   POTASSIUM mmol/L 3.4*   CHLORIDE mmol/L 93*   CO2 mmol/L 29.4*   BUN mg/dL 7*   CREATININE mg/dL 0.82   CALCIUM mg/dL 9.7   BILIRUBIN mg/dL 0.9   ALK PHOS U/L 81   ALT (SGPT) U/L 13   AST (SGOT) U/L 14   GLUCOSE mg/dL 154*     Results from last 7 days   Lab Units 12/10/19  1045   WBC 10*3/mm3 8.80   HEMOGLOBIN g/dL 20.1*   HEMATOCRIT % 56.5*   PLATELETS 10*3/mm3 200         Results from last 7 days   Lab  Units 12/10/19  1045   TROPONIN T ng/mL <0.010     Results from last 7 days   Lab Units 12/10/19  1045   PROBNP pg/mL 105.1             Results from last 7 days   Lab Units 12/10/19  1115   PH, ARTERIAL pH units 7.430   PO2 ART mm Hg 61.6*   PCO2, ARTERIAL mm Hg 51.7*   HCO3 ART mmol/L 34.3*           Invalid input(s): USDES,  BLOODU, NITRITITE, BACT, EP  Pain Management Panel     Pain Management Panel Latest Ref Rng & Units 6/20/2016 6/20/2016    AMPHETAMINES SCREEN, URINE NEGATIVE Negative -    BARBITURATES SCREEN NEGATIVE Negative -    BENZODIAZEPINE SCREEN, URINE NEGATIVE PRESUMPTIVE POSITIVE PRESUMPTIVE POSITIVE    COCAINE SCREEN, URINE NEGATIVE Negative -    METHADONE SCREEN, URINE NEGATIVE Negative -              EKG:      EKG was sinus rhythm, heart rate of 72, there are no acute ST or T wave abnormalities.    Radiology:    Imaging Results (Last 72 Hours)     Procedure Component Value Units Date/Time    XR Chest 2 View [[id number]] Collected:  12/10/19 1108     Updated:  12/10/19 1111    Narrative:       PROCEDURE: XR CHEST 2 VW-        HISTORY: [facility] Triage Protocol     COMPARISON: 02/11/2017.     FINDINGS: The heart is normal in size. The mediastinum is unremarkable.  There are chronic interstitial lung changes. No focal opacities were  effusions are evident. There is no pneumothorax. There are no acute  osseous abnormalities.       Impression:       No acute cardiopulmonary process.           This report was finalized on 12/10/2019 11:09 AM by Esteban Briseno M.D..            COPD with acute exacerbation (CMS/HCC)    Secondary polycythemia    Type 2 diabetes mellitus, without long-term current use of insulin (CMS/HCC)    Essential hypertension    DOMINIQUE and COPD overlap syndrome (CMS/HCC)      Assessment/Plan:    DOMINIQUE and COPD overlap syndrome (CMS/HCC)- (present on admission)   Suspect combination is contributing to overall hypoxia and exertional dyspnea.  He would likely need a sleep study at some point.   I will order 2D echo to evaluate right ventricular systolic pressure.    Essential hypertension- (present on admission)  Patient previously on hydrochlorothiazide and he takes Lasix 40 mg on a as needed basis.  Denies any history of cardiac disease.  Will monitor blood pressure.    Type 2 diabetes mellitus, without long-term current use of insulin (CMS/[facility])- (present on admission)   Will restart home medications once confirmed.  Will have sliding scale insulin coverage as anticipate some increase with steroids.  Carbohydrate consistent diet otherwise.    Secondary polycythemia- (present on admission)  Likely related to underlying severe COPD in setting of ongoing tobacco abuse and DOMINIQUE.    COPD with acute exacerbation (CMS/[facility])- (present on admission)   Patient to be admitted to telemetry.  Symptomatology consistent with underlying COPD exacerbation.  With increased sputum production, will add doxycycline in addition to Solu-Medrol, duo nebs, supplemental oxygen.  Patient also has underlying sleep apnea but has failed CPAP in the past due to intolerance, states this was in 2005.  He may be able to tolerate a new CPAP/BiPAP now.  Patient may also require oxygen therapy upon discharge.    Patient will be admitted for observation due to hypoxia in setting of significant COPD exacerbation.  DVT prophylaxis with Lovenox.  Anticipate discharge within 24 to 48 hours depending upon improvement in clinical condition.    Yane Singletary, DO  12/10/19  3:20 PM    Dictated utilizing Dragon dictation.

## 2019-12-10 NOTE — ASSESSMENT & PLAN NOTE
Patient much improved.  Still with mild wheezing.  Was given Depo-Medrol injection today.  Apparently has had issues with taking prednisone in the past.  Advise use of nebulizer treatments in addition to Breo and Incruse started by pulmonology.  He will have follow-up appointments with pulmonology upon discharge.

## 2019-12-10 NOTE — ED NOTES
Raymond Hinds, assigned Room 410/Tele at 1423. PUJA Samay[place], notified.      Xin Wright  12/10/19 1422

## 2019-12-10 NOTE — ASSESSMENT & PLAN NOTE
Suspect combination is contributing to overall hypoxia and exertional dyspnea.  He would likely need a sleep study at some point.  2D echo with evidence of elevated right-sided pressures in addition to likely acute on chronic cor pulmonale.  Cardiology has been consulted.  CT PE ordered without evidence of PE.  Dr. Roman does recommend follow-up with him as he may have underlying CAD which needs to be addressed in the future.

## 2019-12-10 NOTE — PHARMACY RECOMMENDATION
"Pharmacy Consult for Pharmacokinetics and Stewardship of Anticoagulation Drugs     Stephane Jensen is a  59 y.o. male.  Height - 190.5 cm (75\")  Weight - 125 kg (274 lb 9.6 oz)    BMI (Body Mass Index) = 34.3 kg/m²    Pharmacy consulted for dosing of Enoxaparin.  Indication for use: VTE prophylaxis.    Labs:  Results from last 7 days   Lab Units 12/10/19  1045   HEMOGLOBIN g/dL 20.1*   HEMATOCRIT % 56.5*   PLATELETS 10*3/mm3 200   CREATININE mg/dL 0.82   Estimated Creatinine Clearance: 136.9 mL/min (by C-G formula based on SCr of 0.82 mg/dL).      Assessment:  Initiaited Enoxaparin 60 mg subq q 24 hrs for VTE prophylaxis for BMI > 30 kg/m² and weight > 100 kg.    Thank you for the opportunity to consult on this patient.    Victor Hugo Mix, Pharm.D.  12/10/19  4:02 PM  "

## 2019-12-10 NOTE — ED PROVIDER NOTES
"Subjective   58 y/o male that comes in by private vehicle with c/c \"SOA\" x 2-3 days. Patient states that he has had shortness of breath with associated dry non-productive cough for past 2-3 days. Patient does report significant history of COPD. Does state that he has done at home breathing treatments without relief. Denies being on home oxygen. Patient denies any fever, chills, body aches, chest pain, nausea, vomiting. Patient denies any previous cardiac disease, diabetes, hyperlipidemia.       History provided by:  Patient   used: No    Shortness of Breath   Severity:  Moderate  Onset quality:  Sudden  Duration:  3 days  Timing:  Intermittent  Progression:  Worsening  Chronicity:  New  Context: not activity, not animal exposure, not emotional upset, not known allergens, not occupational exposure, not pollens, not strong odors, not URI and not weather changes    Relieved by:  Nothing  Worsened by:  Exertion, movement and coughing  Ineffective treatments:  Inhaler  Associated symptoms: wheezing    Associated symptoms: no abdominal pain, no chest pain, no cough, no diaphoresis, no headaches, no hemoptysis, no rash, no sputum production, no syncope, no swollen glands and no vomiting    Risk factors: tobacco use    Risk factors: no recent alcohol use, no family hx of DVT, no hx of cancer, no hx of PE/DVT and no obesity        Review of Systems   Constitutional: Negative.  Negative for activity change, appetite change, diaphoresis and fatigue.   HENT: Negative.  Negative for congestion, dental problem, drooling and ear discharge.    Eyes: Negative.  Negative for pain, discharge, redness and itching.   Respiratory: Positive for apnea, chest tightness, shortness of breath and wheezing. Negative for cough, hemoptysis and sputum production.    Cardiovascular: Negative for chest pain and syncope.   Gastrointestinal: Negative for abdominal pain and vomiting.   Endocrine: Negative.  Negative for cold " intolerance, heat intolerance, polydipsia and polyphagia.   Genitourinary: Negative.  Negative for difficulty urinating, flank pain and frequency.   Musculoskeletal: Negative.  Negative for arthralgias, gait problem and joint swelling.   Skin: Negative.  Negative for color change, pallor and rash.   Neurological: Negative for headaches.   Hematological: Negative.  Negative for adenopathy.   Psychiatric/Behavioral: Negative.  Negative for agitation, confusion, decreased concentration, dysphoric mood and hallucinations.   All other systems reviewed and are negative.      Past Medical History:   Diagnosis Date   • Arthritis    • COPD (chronic obstructive pulmonary disease) (CMS/HCC)    • Hypertension    • skin cancer    • Sleep apnea        Allergies   Allergen Reactions   • Corticosteroids Irritability   • Nsaids Anxiety       Past Surgical History:   Procedure Laterality Date   • ADENOIDECTOMY         History reviewed. No pertinent family history.    Social History     Socioeconomic History   • Marital status:      Spouse name: Not on file   • Number of children: Not on file   • Years of education: Not on file   • Highest education level: Not on file   Tobacco Use   • Smoking status: Current Every Day Smoker   • Smokeless tobacco: Never Used   Substance and Sexual Activity   • Alcohol use: No     Comment: quit 20 years ago   • Drug use: Yes     Types: Marijuana     Comment: quit suboxone 2 years ago   • Sexual activity: Defer           Objective   Physical Exam   Constitutional: He is oriented to person, place, and time. He appears well-developed and well-nourished.  Non-toxic appearance. He does not appear ill. No distress.   HENT:   Head: Normocephalic and atraumatic.   Mouth/Throat: Oropharynx is clear and moist. No oropharyngeal exudate or posterior oropharyngeal edema.   Eyes: Pupils are equal, round, and reactive to light. EOM are normal.   Neck: Normal range of motion. Neck supple. No hepatojugular  reflux and no JVD present. No tracheal deviation present. No thyromegaly present.   Cardiovascular: Normal rate, regular rhythm and normal heart sounds.  No extrasystoles are present. Exam reveals no gallop, no friction rub and no decreased pulses.   No murmur heard.  Pulmonary/Chest: Accessory muscle usage present. Tachypnea noted. No bradypnea. No respiratory distress. He has decreased breath sounds. He has wheezes in the right upper field, the right lower field, the left upper field, the left middle field and the left lower field. Chest wall is not dull to percussion. He exhibits no mass, no tenderness, no bony tenderness, no laceration, no crepitus, no edema, no deformity, no swelling and no retraction.   Moderate expiratory wheezing throughout lung fields.    Abdominal: Soft. Bowel sounds are normal. He exhibits no distension, no ascites and no mass. There is no splenomegaly or hepatomegaly. There is no tenderness. There is no rebound and no guarding.   Musculoskeletal: Normal range of motion.        Right lower leg: Normal. He exhibits no tenderness and no edema.        Left lower leg: Normal. He exhibits no tenderness and no edema.   Lymphadenopathy:     He has no cervical adenopathy.   Neurological: He is alert and oriented to person, place, and time. He is not disoriented. No cranial nerve deficit.   Skin: Skin is warm and dry. No ecchymosis and no rash noted. He is not diaphoretic. No cyanosis or erythema. No pallor. Nails show no clubbing.   Psychiatric: He has a normal mood and affect. His behavior is normal. His mood appears not anxious. He is not agitated.   Nursing note and vitals reviewed.      Procedures           ED Course  ED Course as of Dec 10 1422   Tue Dec 10, 2019   1054 CBC & Differential(!) [BH]   1055 CBC & Differential(!) [BH]   1129 EKG interpreted by me reveals sinus rhythm with rate of 72 bpm.  There are few nonspecific findings but no obvious acute ST segment or T wave changes.  This  is an atypical appearing EKG.    [TB]   1225 Blood Gas, Arterial With Co-Ox(!) [BH]   1225 No acute cardiopulmonary process.    [BH]   1237 Patient has improved. Will give second neb treatment.     [BH]   1416 Without oxygen NC patient oxygen has dropped to 87-88%. Will call hospitalist for possible admit.     [BH]   1419 Discussed care with Dr. Dodd. Patient will be admitted to telemetry for observation for COPD exacerbation and hypoxia.     [BH]      ED Course User Index  [] David Naylor PA-C  [TB] Geneva Lewis MD                      No data recorded                        MDM    Final diagnoses:   COPD with acute exacerbation (CMS/[facility])   Hypoxia              David Naylor PA-C  12/10/19 0567

## 2019-12-11 ENCOUNTER — APPOINTMENT (OUTPATIENT)
Dept: CT IMAGING | Facility: HOSPITAL | Age: 59
End: 2019-12-11

## 2019-12-11 LAB
ALBUMIN SERPL-MCNC: 4.2 G/DL (ref 3.5–5.2)
ALBUMIN/GLOB SERPL: 1.3 G/DL
ALP SERPL-CCNC: 72 U/L (ref 39–117)
ALT SERPL W P-5'-P-CCNC: 11 U/L (ref 1–41)
ANION GAP SERPL CALCULATED.3IONS-SCNC: 11.8 MMOL/L (ref 5–15)
AST SERPL-CCNC: 12 U/L (ref 1–40)
BASOPHILS # BLD AUTO: 0.04 10*3/MM3 (ref 0–0.2)
BASOPHILS NFR BLD AUTO: 0.3 % (ref 0–1.5)
BILIRUB SERPL-MCNC: 0.4 MG/DL (ref 0.2–1.2)
BUN BLD-MCNC: 8 MG/DL (ref 8–23)
BUN/CREAT SERPL: 12.1 (ref 7–25)
CALCIUM SPEC-SCNC: 8.7 MG/DL (ref 8.6–10.5)
CHLORIDE SERPL-SCNC: 94 MMOL/L (ref 98–107)
CHOLEST SERPL-MCNC: 131 MG/DL (ref 0–200)
CO2 SERPL-SCNC: 28.2 MMOL/L (ref 22–29)
CREAT BLD-MCNC: 0.66 MG/DL (ref 0.76–1.27)
DEPRECATED RDW RBC AUTO: 47.2 FL (ref 37–54)
EOSINOPHIL # BLD AUTO: 0 10*3/MM3 (ref 0–0.4)
EOSINOPHIL NFR BLD AUTO: 0 % (ref 0.3–6.2)
ERYTHROCYTE [DISTWIDTH] IN BLOOD BY AUTOMATED COUNT: 12.8 % (ref 12.3–15.4)
GFR SERPL CREATININE-BSD FRML MDRD: 123 ML/MIN/1.73
GLOBULIN UR ELPH-MCNC: 3.3 GM/DL
GLUCOSE BLD-MCNC: 211 MG/DL (ref 65–99)
GLUCOSE BLDC GLUCOMTR-MCNC: 130 MG/DL (ref 70–130)
GLUCOSE BLDC GLUCOMTR-MCNC: 154 MG/DL (ref 70–130)
GLUCOSE BLDC GLUCOMTR-MCNC: 189 MG/DL (ref 70–130)
GLUCOSE BLDC GLUCOMTR-MCNC: 222 MG/DL (ref 70–130)
HBA1C MFR BLD: 6.7 % (ref 4.8–5.6)
HCT VFR BLD AUTO: 55.2 % (ref 37.5–51)
HDLC SERPL-MCNC: 32 MG/DL (ref 40–60)
HGB BLD-MCNC: 19.1 G/DL (ref 13–17.7)
IMM GRANULOCYTES # BLD AUTO: 0.06 10*3/MM3 (ref 0–0.05)
IMM GRANULOCYTES NFR BLD AUTO: 0.5 % (ref 0–0.5)
LDLC SERPL CALC-MCNC: 73 MG/DL (ref 0–100)
LDLC/HDLC SERPL: 2.27 {RATIO}
LYMPHOCYTES # BLD AUTO: 0.68 10*3/MM3 (ref 0.7–3.1)
LYMPHOCYTES NFR BLD AUTO: 5.3 % (ref 19.6–45.3)
MAXIMAL PREDICTED HEART RATE: 160 BPM
MCH RBC QN AUTO: 34.3 PG (ref 26.6–33)
MCHC RBC AUTO-ENTMCNC: 34.6 G/DL (ref 31.5–35.7)
MCV RBC AUTO: 99.1 FL (ref 79–97)
MONOCYTES # BLD AUTO: 0.27 10*3/MM3 (ref 0.1–0.9)
MONOCYTES NFR BLD AUTO: 2.1 % (ref 5–12)
NEUTROPHILS # BLD AUTO: 11.81 10*3/MM3 (ref 1.7–7)
NEUTROPHILS NFR BLD AUTO: 91.8 % (ref 42.7–76)
NRBC BLD AUTO-RTO: 0 /100 WBC (ref 0–0.2)
PLATELET # BLD AUTO: 180 10*3/MM3 (ref 140–450)
PMV BLD AUTO: 10.2 FL (ref 6–12)
POTASSIUM BLD-SCNC: 3.8 MMOL/L (ref 3.5–5.2)
PROT SERPL-MCNC: 7.5 G/DL (ref 6–8.5)
RBC # BLD AUTO: 5.57 10*6/MM3 (ref 4.14–5.8)
SODIUM BLD-SCNC: 134 MMOL/L (ref 136–145)
STRESS TARGET HR: 136 BPM
TRIGL SERPL-MCNC: 132 MG/DL (ref 0–150)
TSH SERPL DL<=0.05 MIU/L-ACNC: 0.15 UIU/ML (ref 0.27–4.2)
VLDLC SERPL-MCNC: 26.4 MG/DL
WBC NRBC COR # BLD: 12.86 10*3/MM3 (ref 3.4–10.8)

## 2019-12-11 PROCEDURE — 25010000002 ENOXAPARIN PER 10 MG: Performed by: FAMILY MEDICINE

## 2019-12-11 PROCEDURE — 80061 LIPID PANEL: CPT | Performed by: INTERNAL MEDICINE

## 2019-12-11 PROCEDURE — 99232 SBSQ HOSP IP/OBS MODERATE 35: CPT | Performed by: FAMILY MEDICINE

## 2019-12-11 PROCEDURE — 25010000002 IOPAMIDOL 61 % SOLUTION: Performed by: FAMILY MEDICINE

## 2019-12-11 PROCEDURE — 94799 UNLISTED PULMONARY SVC/PX: CPT

## 2019-12-11 PROCEDURE — 84443 ASSAY THYROID STIM HORMONE: CPT | Performed by: INTERNAL MEDICINE

## 2019-12-11 PROCEDURE — 25010000002 METHYLPREDNISOLONE PER 125 MG: Performed by: FAMILY MEDICINE

## 2019-12-11 PROCEDURE — 94660 CPAP INITIATION&MGMT: CPT

## 2019-12-11 PROCEDURE — 63710000001 INSULIN ASPART PER 5 UNITS: Performed by: FAMILY MEDICINE

## 2019-12-11 PROCEDURE — 25010000002 METHYLPREDNISOLONE PER 40 MG: Performed by: INTERNAL MEDICINE

## 2019-12-11 PROCEDURE — 71275 CT ANGIOGRAPHY CHEST: CPT

## 2019-12-11 PROCEDURE — 99223 1ST HOSP IP/OBS HIGH 75: CPT | Performed by: INTERNAL MEDICINE

## 2019-12-11 PROCEDURE — 85025 COMPLETE CBC W/AUTO DIFF WBC: CPT | Performed by: FAMILY MEDICINE

## 2019-12-11 PROCEDURE — 82962 GLUCOSE BLOOD TEST: CPT

## 2019-12-11 PROCEDURE — 80053 COMPREHEN METABOLIC PANEL: CPT | Performed by: FAMILY MEDICINE

## 2019-12-11 PROCEDURE — 83036 HEMOGLOBIN GLYCOSYLATED A1C: CPT | Performed by: INTERNAL MEDICINE

## 2019-12-11 RX ORDER — METHYLPREDNISOLONE SODIUM SUCCINATE 40 MG/ML
40 INJECTION, POWDER, LYOPHILIZED, FOR SOLUTION INTRAMUSCULAR; INTRAVENOUS EVERY 12 HOURS
Status: DISCONTINUED | OUTPATIENT
Start: 2019-12-11 | End: 2019-12-12

## 2019-12-11 RX ADMIN — GABAPENTIN 600 MG: 300 CAPSULE ORAL at 13:16

## 2019-12-11 RX ADMIN — DIAZEPAM 10 MG: 5 TABLET ORAL at 09:16

## 2019-12-11 RX ADMIN — INSULIN ASPART 2 UNITS: 100 INJECTION, SOLUTION INTRAVENOUS; SUBCUTANEOUS at 07:13

## 2019-12-11 RX ADMIN — GABAPENTIN 600 MG: 300 CAPSULE ORAL at 06:08

## 2019-12-11 RX ADMIN — IPRATROPIUM BROMIDE AND ALBUTEROL SULFATE 3 ML: .5; 3 SOLUTION RESPIRATORY (INHALATION) at 15:20

## 2019-12-11 RX ADMIN — METHYLPREDNISOLONE SODIUM SUCCINATE 60 MG: 125 INJECTION, POWDER, FOR SOLUTION INTRAMUSCULAR; INTRAVENOUS at 09:15

## 2019-12-11 RX ADMIN — BUDESONIDE AND FORMOTEROL FUMARATE DIHYDRATE 2 PUFF: 160; 4.5 AEROSOL RESPIRATORY (INHALATION) at 07:07

## 2019-12-11 RX ADMIN — ENOXAPARIN SODIUM 60 MG: 60 INJECTION SUBCUTANEOUS at 17:14

## 2019-12-11 RX ADMIN — IPRATROPIUM BROMIDE AND ALBUTEROL SULFATE 3 ML: .5; 3 SOLUTION RESPIRATORY (INHALATION) at 07:07

## 2019-12-11 RX ADMIN — POTASSIUM CHLORIDE 10 MEQ: 750 CAPSULE, EXTENDED RELEASE ORAL at 09:16

## 2019-12-11 RX ADMIN — LISINOPRIL 10 MG: 10 TABLET ORAL at 09:16

## 2019-12-11 RX ADMIN — SODIUM CHLORIDE, PRESERVATIVE FREE 10 ML: 5 INJECTION INTRAVENOUS at 09:17

## 2019-12-11 RX ADMIN — SERTRALINE HYDROCHLORIDE 100 MG: 50 TABLET ORAL at 09:16

## 2019-12-11 RX ADMIN — DIAZEPAM 10 MG: 5 TABLET ORAL at 21:44

## 2019-12-11 RX ADMIN — NICOTINE 1 PATCH: 21 PATCH TRANSDERMAL at 17:14

## 2019-12-11 RX ADMIN — DOXYCYCLINE 100 MG: 100 INJECTION, POWDER, LYOPHILIZED, FOR SOLUTION INTRAVENOUS at 05:17

## 2019-12-11 RX ADMIN — GUAIFENESIN 600 MG: 600 TABLET, EXTENDED RELEASE ORAL at 17:14

## 2019-12-11 RX ADMIN — LINAGLIPTIN 5 MG: 5 TABLET, FILM COATED ORAL at 09:16

## 2019-12-11 RX ADMIN — BUPRENORPHINE HYDROCHLORIDE, NALOXONE HYDROCHLORIDE 1 FILM: 4; 1 FILM, SOLUBLE BUCCAL; SUBLINGUAL at 09:17

## 2019-12-11 RX ADMIN — INSULIN ASPART 2 UNITS: 100 INJECTION, SOLUTION INTRAVENOUS; SUBCUTANEOUS at 11:51

## 2019-12-11 RX ADMIN — METHYLPREDNISOLONE SODIUM SUCCINATE 40 MG: 40 INJECTION, POWDER, FOR SOLUTION INTRAMUSCULAR; INTRAVENOUS at 21:43

## 2019-12-11 RX ADMIN — GUAIFENESIN 600 MG: 600 TABLET, EXTENDED RELEASE ORAL at 06:08

## 2019-12-11 RX ADMIN — GLIPIZIDE 5 MG: 5 TABLET ORAL at 07:13

## 2019-12-11 RX ADMIN — BENZOCAINE AND MENTHOL 1 LOZENGE: 15; 3.6 LOZENGE ORAL at 01:45

## 2019-12-11 RX ADMIN — IOPAMIDOL 100 ML: 612 INJECTION, SOLUTION INTRAVENOUS at 15:30

## 2019-12-11 RX ADMIN — IPRATROPIUM BROMIDE AND ALBUTEROL SULFATE 3 ML: .5; 3 SOLUTION RESPIRATORY (INHALATION) at 11:22

## 2019-12-11 RX ADMIN — MICONAZOLE NITRATE: 2 POWDER TOPICAL at 11:52

## 2019-12-11 RX ADMIN — SODIUM CHLORIDE, PRESERVATIVE FREE 10 ML: 5 INJECTION INTRAVENOUS at 21:44

## 2019-12-11 RX ADMIN — INSULIN ASPART 3 UNITS: 100 INJECTION, SOLUTION INTRAVENOUS; SUBCUTANEOUS at 21:57

## 2019-12-11 RX ADMIN — GABAPENTIN 600 MG: 300 CAPSULE ORAL at 21:43

## 2019-12-11 RX ADMIN — BUDESONIDE AND FORMOTEROL FUMARATE DIHYDRATE 2 PUFF: 160; 4.5 AEROSOL RESPIRATORY (INHALATION) at 19:12

## 2019-12-11 RX ADMIN — IPRATROPIUM BROMIDE AND ALBUTEROL SULFATE 3 ML: .5; 3 SOLUTION RESPIRATORY (INHALATION) at 03:35

## 2019-12-11 RX ADMIN — BUPRENORPHINE HYDROCHLORIDE, NALOXONE HYDROCHLORIDE 1 FILM: 8; 2 FILM, SOLUBLE BUCCAL; SUBLINGUAL at 09:17

## 2019-12-11 RX ADMIN — BENZONATATE 200 MG: 100 CAPSULE ORAL at 09:26

## 2019-12-11 RX ADMIN — MICONAZOLE NITRATE: 2 POWDER TOPICAL at 21:44

## 2019-12-11 RX ADMIN — DOXYCYCLINE 100 MG: 100 INJECTION, POWDER, LYOPHILIZED, FOR SOLUTION INTRAVENOUS at 17:14

## 2019-12-11 RX ADMIN — IPRATROPIUM BROMIDE AND ALBUTEROL SULFATE 3 ML: .5; 3 SOLUTION RESPIRATORY (INHALATION) at 19:12

## 2019-12-11 RX ADMIN — HYDROCHLOROTHIAZIDE 25 MG: 25 TABLET ORAL at 09:16

## 2019-12-11 NOTE — PROGRESS NOTES
Adult Nutrition  Assessment/PES    Patient Name:  Stephane Jensen  YOB: 1960  MRN: [id number]  Admit Date:  12/10/2019    Assessment Date:  12/11/2019    Comments:  Rec. #1: Continue with diet as ordered. Pt. Consuming ~80% of meals on average over three meals per NSG. Rec. #2: Continue to monitor/replace electrolytes. Consult RD PRN.     Reason for Assessment     Row Name 12/11/19 1428          Reason for Assessment    Reason For Assessment  diagnosis/disease state     Diagnosis  diabetes diagnosis/complications;pulmonary disease;cardiac disease COPD, HTN, DM Type 2, substance abuse and tobacco use noted PTA     Identified At Risk by Screening Criteria  BMI             Labs/Tests/Procedures/Meds     Row Name 12/11/19 1430          Labs/Procedures/Meds    Lab Results Reviewed  reviewed, pertinent     Lab Results Comments  High: Gluc, HgbA1c  Low: Na, Cl, Cr        Medications    Pertinent Medications Reviewed  reviewed, pertinent         Physical Findings     Row Name 12/11/19 1430          Physical Findings    Overall Physical Appearance  obese         Estimated/Assessed Needs     Row Name 12/11/19 1430          Calculation Measurements    Weight Used For Calculations  97.9 kg (215 lb 13.3 oz)        Estimated/Assessed Needs    Additional Documentation  Galax-St. Jeor Equation (Group);Fluid Requirements (Group);Protein Requirements (Group);Calorie Requirements (Group)        Calorie Requirements    Estimated Calorie Requirement Comment  8332-5190        Galax-St. Jeor Equation    RMR (Galax-St. Jeor Equation)  1874.625        Protein Requirements    Weight Used For Protein Calculations  97.9 kg (215 lb 13.3 oz)     Est Protein Requirement Amount (gms/kg)  1.5 gm protein 118-147 gm/day     Estimated Protein Requirements (gms/day)  146.85        Fluid Requirements    Estimated Fluid Requirement Method  Gerda-Segar Formula     [place]-Segar Method (over 20 kg)  0950         Nutrition  Prescription Ordered     Row Name 12/11/19 1431          Nutrition Prescription PO    Current PO Diet  Regular     Common Modifiers  Cardiac;Consistent Carbohydrate         Evaluation of Received Nutrient/Fluid Intake     Row Name 12/11/19 1431          PO Evaluation    Number of Days PO Intake Evaluated  2 days     Number of Meals  3     % PO Intake  83               Problem/Interventions:  Problem 1     Row Name 12/11/19 1431          Nutrition Diagnoses Problem 1    Problem 1  Overweight/Obesity     Etiology (related to)  Factors Affecting Nutrition     Food Habit/Preferences  Large Meals     Signs/Symptoms (evidenced by)  BMI     BMI  30 - 34.9         Problem 2     Row Name 12/11/19 1432          Nutrition Diagnoses Problem 2    Problem 2  Increased Nutrient Needs     Macronutrient  Kcal;Protein     Etiology (related to)  Medical Diagnosis     Pulmonary/Critical Care  COPD     Signs/Symptoms (evidenced by)  Other (comment) pulmonary dysfunction         Problem 3     Row Name 12/11/19 1432          Nutrition Diagnoses Problem 3    Problem 3  Impaired Nutrient Utilization     Etiology (related to)  Medical Diagnosis     Endocrine  DM Type 2     Signs/Symptoms (evidenced by)  Biochemical     Specific Labs Noted  Glucose;HgbA1C           Intervention Goal     Row Name 12/11/19 1432          Intervention Goal    General  Meet nutritional needs for age/condition     PO  PO intake (%)     PO Intake %  -- 50-75         Nutrition Intervention     Row Name 12/11/19 1433          Nutrition Intervention    RD/Tech Action  Follow Tx progress         Nutrition Prescription     Row Name 12/11/19 1433          Nutrition Prescription PO    PO Prescription  -- continue with diet as ordered        Other Orders    Other  continue to monitor/replace electrolytes         Education/Evaluation     Row Name 12/11/19 1433          Education    Education  Will Instruct as appropriate        Monitor/Evaluation    Monitor  Per protocol;PO  intake;Pertinent labs;Weight           Electronically signed by:  Pratibha Bean RD  12/11/19 2:34 PM

## 2019-12-11 NOTE — PLAN OF CARE
Problem: Patient Care Overview  Goal: Plan of Care Review  Outcome: Ongoing (interventions implemented as appropriate)  Flowsheets (Taken 12/11/2019 0400)  Progress: no change  Plan of Care Reviewed With: patient  Outcome Summary: Continues to have frequent cough, LS wheezes/rhonchi, maintaining o2 sats >90% on 3L NC, VSS

## 2019-12-11 NOTE — CONSULTS
Referring Provider: Dr Singletary   Reason for Consultation: [facility]    Patient Care Team:  Leticia Whittaker APRN as PCP - General (Nurse Practitioner)      History of present illness: Middle-aged gentleman with multiple medical problems was presented with increasing shortness of breath has been noted to be in hypercarbic respiratory failure he is being treated for possible lower respiratory infection at this time.  Had an echocardiogram which reveals wall motion normality with borderline RV systolic function.    Patient admits that he has not been functionally very active but gets exhausted when he walks short distances.  He has continued to smoke up to 2 packs/day has no willpower to go get off the same.  Review of Systems   Pertinent items are noted in HPI  Review of Systems      History    Baseline EKG sinus rhythm CT of 152 ms rate of 72 beats a minute no acute ST segment changes.    LV function assessment EF 65 to 70% echocardiogram 12/19.    CAD work-up: None.    Hypertension.    Cor pulmonale-dilated RV with wall motion suggestive of acute on chronic cor pulmonale echocardiogram 12/19.    Sleep apnea syndrome: Diagnosed many years ago could not keep up his mask hence went on to have surgery.  Did well for 2 years up till about 2008 after which continues to snore quite a bit and unable to use a CPAP mask due to financial reasons.    Morbid obesity with recent weight gain.    Active nicotine abuse-2 packs of cigarettes a day.    COPD with emphysema.    Diabetes mellitus.      Testosterone deficiency.        Personal history:    Smokes up to 2 packs/day no history of focal consumption functional status the patient has been limited    Gets pain in his legs when he walks even short distances gets very short of breath on minimal amount of activity.    Family history:    Has 2 brothers were did not have any cardiac issues.  Parents have not had any cardiac issues.    Review of symptoms:    Has had incessant cough  has been having expectoration.  Has minimal breath to do any form of activity.  Some swelling of the lower extremities does occur.  There is no stroke no weakness no joint swelling rest of the review symptoms are negative.            Past Surgical History:   Procedure Laterality Date   • ADENOIDECTOMY     , History reviewed. No pertinent family history., Social History     Tobacco Use   • Smoking status: Current Every Day Smoker   • Smokeless tobacco: Never Used   Substance Use Topics   • Alcohol use: No     Comment: quit 20 years ago   • Drug use: Yes     Types: Marijuana     Comment: quit suboxone 2 years ago   , Medications Prior to Admission   Medication Sig Dispense Refill Last Dose   • albuterol (PROVENTIL) (2.5 MG/3ML) 0.083% nebulizer solution Take 2.5 mg by nebulization Every 4 (Four) Hours As Needed for wheezing. 30 vial 0 12/10/2019 at Unknown time   • BREO ELLIPTA 200-25 MCG/INH aerosol powder  inhaler Inhale 1 puff Daily.   12/10/2019 at Unknown time   • buprenorphine-naloxone (SUBOXONE) 8-2 MG film film Place 1 film under the tongue Daily. 1.5 FILMS/DAY   12/10/2019 at Unknown time   • diazePAM (VALIUM) 10 MG tablet Take 10 mg by mouth 2 (Two) Times a Day.  0 12/10/2019 at Unknown time   • docusate sodium (COLACE) 100 MG capsule Take 100 mg by mouth 2 (Two) Times a Day As Needed for Constipation.   Past Week at Unknown time   • furosemide (LASIX) 40 MG tablet Take 40 mg by mouth Daily As Needed.   Past Month at Unknown time   • gabapentin (NEURONTIN) 600 MG tablet Take 600 mg by mouth 3 (Three) Times a Day.   12/10/2019 at Unknown time   • glipiZIDE (GLUCOTROL) 5 MG tablet Take 5 mg by mouth 2 (Two) Times a Day Before Meals.   12/10/2019 at Unknown time   • hydrochlorothiazide (HYDRODIURIL) 25 MG tablet Take 25 mg by mouth Daily.   12/10/2019 at Unknown time   • JANUVIA 100 MG tablet Take 100 mg by mouth Daily.   12/10/2019 at Unknown time   • potassium chloride (MICRO-K) 10 MEQ CR capsule Take 10 mEq  by mouth Daily As Needed. TAKES WITH LASIX   Past Month at Unknown time   • PROAIR  (90 Base) MCG/ACT inhaler Inhale 2 puffs Every 4 (Four) Hours As Needed for Wheezing or Shortness of Air.   12/10/2019 at Unknown time   • sertraline (ZOLOFT) 100 MG tablet Take 100 mg by mouth Daily.   12/10/2019 at Unknown time   • Testosterone Cypionate (DEPOTESTOTERONE CYPIONATE) 200 MG/ML injection Inject 200 mg into the appropriate muscle as directed by prescriber Every 14 (Fourteen) Days. DUE Tuesday 12/17/19   Unknown at Unknown time   • tiotropium (SPIRIVA) 18 MCG per inhalation capsule Place 1 capsule into inhaler and inhale Daily.   Unknown at Unknown time   , Scheduled Meds:    budesonide-formoterol 2 puff Inhalation BID - RT   buprenorphine-naloxone 1 film Sublingual Daily   And      buprenorphine-naloxone 1 film Sublingual Daily   diazePAM 10 mg Oral BID   doxycycline 100 mg Intravenous Q12H   enoxaparin 60 mg Subcutaneous Q24H   gabapentin 600 mg Oral Q8H   glipizide 5 mg Oral QAM AC   guaiFENesin 600 mg Oral Q12H   hydroCHLOROthiazide 25 mg Oral Daily   insulin aspart 0-7 Units Subcutaneous 4x Daily AC & at Bedtime   ipratropium-albuterol 3 mL Nebulization Q4H - RT   linagliptin 5 mg Oral Daily   lisinopril 10 mg Oral Q24H   methylPREDNISolone sodium succinate 40 mg Intravenous Q12H   miconazole  Topical Q12H   nicotine 1 patch Transdermal Q24H   potassium chloride 10 mEq Oral Daily   sertraline 100 mg Oral Daily   , Continuous Infusions:    Pharmacy to Dose enoxaparin (LOVENOX)    , PRN Meds:  •  benzocaine-menthol  •  benzonatate  •  dextrose  •  dextrose  •  docusate sodium  •  glucagon (human recombinant)  •  Pharmacy to Dose enoxaparin (LOVENOX)  •  sodium chloride, Allergies:  Corticosteroids and Nsaids     OBJECTIVE:    Vital Sign Min/Max for last 24 hours  Temp  Min: 97.4 °F (36.3 °C)  Max: 98.5 °F (36.9 °C)   BP  Min: 134/90  Max: 168/94   Pulse  Min: 65  Max: 98   Resp  Min: 16  Max: 22   SpO2  Min:  "90 %  Max: 100 %   No data recorded   Weight  Min: 125 kg (274 lb 9.6 oz)  Max: 125 kg (274 lb 9.6 oz)     Flowsheet Rows      First Filed Value   Admission Height  190.5 cm (75\") Documented at 12/10/2019 1034   Admission Weight  125 kg (276 lb) Documented at 12/10/2019 1034               Physical Exam:     General Appearance:    Alert, cooperative, in no acute distress   Head:    Normocephalic, without obvious abnormality, atraumatic   Eyes:            Lids and lashes normal, conjunctivae and sclerae normal, no   icterus, no pallor, corneas clear, PERRLA   Ears:    Ears appear intact with no abnormalities noted   Throat:   No oral lesions, no thrush, oral mucosa moist   Neck:   No adenopathy, supple, trachea midline, no thyromegaly, no   carotid bruit, no JVD   Back:     No kyphosis present, no scoliosis present, no skin lesions,      erythema or scars, no tenderness to percussion or                   palpation,   range of motion normal   Lungs:    Decreased breath sounds with scattered rhonchi all over.    Heart:    Regular rhythm and normal rate, normal S1 and S2, no            murmur, no gallop, no rub, no click   Chest Wall:    No abnormalities observed   Abdomen:     Normal bowel sounds, no masses, no organomegaly, soft        non-tender, non-distended, no guarding, no rebound                tenderness   Rectal:     Deferred   Extremities:   Moves all extremities well, no edema, no cyanosis, no             redness   Pulses:   Pulses palpable and equal bilaterally   Skin:   No bleeding, bruising or rash   Lymph nodes:   No palpable adenopathy   Neurologic:   Cranial nerves 2 - 12 grossly intact, sensation intact, DTR       present and equal bilaterally           EKG: Sinus rhythm  ms rate of 72 beats a minute no acute ST segment changes.  LAB DATA :           WBC   Date Value Ref Range Status   12/11/2019 12.86 (H) 3.40 - 10.80 10*3/mm3 Final     RBC   Date Value Ref Range Status   12/11/2019 5.57 4.14 - " 5.80 10*6/mm3 Final     Hemoglobin   Date Value Ref Range Status   12/11/2019 19.1 (H) 13.0 - 17.7 g/dL Final     Hematocrit   Date Value Ref Range Status   12/11/2019 55.2 (H) 37.5 - 51.0 % Final     MCV   Date Value Ref Range Status   12/11/2019 99.1 (H) 79.0 - 97.0 fL Final     MCH   Date Value Ref Range Status   12/11/2019 34.3 (H) 26.6 - 33.0 pg Final     MCHC   Date Value Ref Range Status   12/11/2019 34.6 31.5 - 35.7 g/dL Final     RDW   Date Value Ref Range Status   12/11/2019 12.8 12.3 - 15.4 % Final     RDW-SD   Date Value Ref Range Status   12/11/2019 47.2 37.0 - 54.0 fl Final     MPV   Date Value Ref Range Status   12/11/2019 10.2 6.0 - 12.0 fL Final     Platelets   Date Value Ref Range Status   12/11/2019 180 140 - 450 10*3/mm3 Final     Neutrophil %   Date Value Ref Range Status   12/11/2019 91.8 (H) 42.7 - 76.0 % Final     Lymphocyte %   Date Value Ref Range Status   12/11/2019 5.3 (L) 19.6 - 45.3 % Final     Monocyte %   Date Value Ref Range Status   12/11/2019 2.1 (L) 5.0 - 12.0 % Final     Eosinophil %   Date Value Ref Range Status   12/11/2019 0.0 (L) 0.3 - 6.2 % Final     Basophil %   Date Value Ref Range Status   12/11/2019 0.3 0.0 - 1.5 % Final     Immature Grans %   Date Value Ref Range Status   12/11/2019 0.5 0.0 - 0.5 % Final     Neutrophils, Absolute   Date Value Ref Range Status   12/11/2019 11.81 (H) 1.70 - 7.00 10*3/mm3 Final     Lymphocytes, Absolute   Date Value Ref Range Status   12/11/2019 0.68 (L) 0.70 - 3.10 10*3/mm3 Final     Monocytes, Absolute   Date Value Ref Range Status   12/11/2019 0.27 0.10 - 0.90 10*3/mm3 Final     Eosinophils, Absolute   Date Value Ref Range Status   12/11/2019 0.00 0.00 - 0.40 10*3/mm3 Final     Basophils, Absolute   Date Value Ref Range Status   12/11/2019 0.04 0.00 - 0.20 10*3/mm3 Final     Immature Grans, Absolute   Date Value Ref Range Status   12/11/2019 0.06 (H) 0.00 - 0.05 10*3/mm3 Final     nRBC   Date Value Ref Range Status   12/11/2019 0.0 0.0  - 0.2 /100 WBC Final       Lab Results   Component Value Date    GLUCOSE 211 (H) 12/11/2019    BUN 8 12/11/2019    CREATININE 0.66 (L) 12/11/2019    EGFRIFNONA 123 12/11/2019    BCR 12.1 12/11/2019    CO2 28.2 12/11/2019    CALCIUM 8.7 12/11/2019    ALBUMIN 4.20 12/11/2019    LABIL2 1.5 06/20/2016    AST 12 12/11/2019    ALT 11 12/11/2019       Lab Results   Component Value Date    CKTOTAL 200 (H) 11/18/2015    CKMB 2.3 11/18/2015    TROPONINI <0.012 02/11/2017    TROPONINT <0.010 12/10/2019       No results found for: DDIMER    Site   Date Value Ref Range Status   12/10/2019 Left Radial  Final     Adria's Test   Date Value Ref Range Status   12/10/2019 Positive  Final     pH, Arterial   Date Value Ref Range Status   12/10/2019 7.430 7.300 - 7.500 pH units Final     pCO2, Arterial   Date Value Ref Range Status   12/10/2019 51.7 (H) 35.0 - 45.0 mm Hg Final     Comment:     83 Value above reference range     pO2, Arterial   Date Value Ref Range Status   12/10/2019 61.6 (L) 75.0 - 100.0 mm Hg Final     Comment:     84 Value below reference range     HCO3, Arterial   Date Value Ref Range Status   12/10/2019 34.3 (H) 22.0 - 28.0 mmol/L Final     Comment:     83 Value above reference range     Base Excess, Arterial   Date Value Ref Range Status   12/10/2019 7.6 (H) 0.0 - 2.0 mmol/L Final     Comment:     83 Value above reference range     O2 Saturation, Arterial   Date Value Ref Range Status   12/10/2019 92.1 (L) 94.0 - 100.0 % Final     Comment:     84 Value below reference range     Hemoglobin, Blood Gas   Date Value Ref Range Status   12/10/2019 20.0 (H) 12 - 18 g/dL Final     Comment:     83 Value above reference range     Hematocrit, Blood Gas   Date Value Ref Range Status   12/10/2019 61.2 % Final     Comment:     83 Value above reference range     Oxyhemoglobin   Date Value Ref Range Status   12/10/2019 88.0 (L) 94 - 99 % Final     Comment:     84 Value below reference range     Methemoglobin   Date Value Ref Range  Status   12/10/2019 0.60 0.00 - 1.50 % Final     Carboxyhemoglobin   Date Value Ref Range Status   12/10/2019 3.9 (H) 0 - 2 % Final     Barometric Pressure for Blood Gas   Date Value Ref Range Status   12/10/2019 738 mmHg Final     Modality   Date Value Ref Range Status   12/10/2019 Nasal Cannula  Final     No results found for: HGBA1C      No results found for: LIPASE    IMAGING DATA:     Xr Chest 2 View    Result Date: 12/10/2019  Narrative: PROCEDURE: XR CHEST 2 VW-    HISTORY: [facility] Triage Protocol  COMPARISON: 02/11/2017.  FINDINGS: The heart is normal in size. The mediastinum is unremarkable. There are chronic interstitial lung changes. No focal opacities were effusions are evident. There is no pneumothorax. There are no acute osseous abnormalities.      Impression: No acute cardiopulmonary process.    This report was finalized on 12/10/2019 11:09 AM by Esteban Briseno M.D..        DIAGNOSIS     #1 acute on chronic cor pulmonale:  Patient's echocardiogram is suggestive of the same.  He has a reasonable risk profile for thromboembolic phenomena also.  It may be worth doing a CT of the chest PE protocol 1 time to evaluate for the same.  Nevertheless majority of his right heart issues appear to be related to his untreated sleep apnea as well as COPD with emphysema.  This has been explained to him.    2.  Coronary artery disease:  Has a high risk profile for the same given his incessant smoking.  Nevertheless enzymes have been negative.  Will get evaluation of the risk profile and advance therapy as needed.    3.  Sleep apnea syndrome:  Will need treatment for the same.  He needs help with respect to finances for this.  Weight loss also has been suggested.    4.  Active nicotine abuse:  Need to quit the cigarettes have been discussed all over again.  Patient expresses understanding feels his willpower is too weak to get this accomplished.    5.  Obesity:  Needs significant weight loss and increase in  activity.    Thank you for letting me take part in the care of Mr. Jensen          COPD with acute exacerbation (CMS/HCC)    Secondary polycythemia    Type 2 diabetes mellitus, without long-term current use of insulin (CMS/HCC)    Essential hypertension    DOMINIQUE and COPD overlap syndrome (CMS/HCC)          I discussed the patients findings and my recommendations with patient    Freedom Roman MD  12/11/19  1:08 PM      Please note that portions of this note may have been completed with a voice recognition program. Efforts were made to edit the dictations, but occasionally words are mistranscribed.

## 2019-12-11 NOTE — PROGRESS NOTES
[facility]    Name:  Stephane Jensen   Age:  59 y.o.  Sex:  male  :  1960  MRN:  [id number]   Visit Number:  [id number]  Admission Date:  12/10/2019  Date Of Service:  19  Primary Care Physician:  Leticia Whittaker APRN     LOS: 0 days :  Patient Care Team:  Leticia Whittaker APRN as PCP - General (Nurse Practitioner):    Chief Complaint:      Follow-up on shortness of breath    Subjective / Interval History:     Patient admits ongoing shortness of breath with minimal exertion.  He does state his cough is slightly better today.  He has not been out of the bed much.  I reviewed his chest x-ray and echocardiogram findings with him.  I reviewed we will consult pulmonology and cardiology to see him.  He is agreeable.  Denies any fevers or chills.    Prior work-up:  Patient is a 59-year-old gentleman with longstanding history of COPD and tobacco abuse who presents with complaints of shortness of breath and cough.  Patient with medical history of COPD, DOMINIQUE, obesity, hypertension, type 2 diabetes, substance abuse, and ongoing tobacco abuse.  Patient states that he has had shortness of breath for a long time, however over the last week he has had increased sputum production with green color in addition to significant shortness of breath.  He is very winded with minimal exertion.  Continues to smoke at least a pack of cigarettes a day.  He does have a history of substance abuse, he is on Suboxone therapy and follows with psychiatric services.  He denies chest pains or palpitations.  He denies lower extremity swelling.  He states he is on Lasix on a as needed basis.  He denies cardiac history.     In the ER, patient presented hypertensive, tachypneic, and afebrile.  He was satting in the upper 80s on room air and was placed on 2 L nasal cannula.  Procalcitonin 0.04, white blood cell count 8800, hemoglobin of 20, hematocrit 56.5.  Troponin negative.  proBNP 105.   Creatinine 0.82, potassium 3.4 ABG with pH 7.43, PCO2 51, PO2 of 61 on 2 L nasal cannula.  Chest x-ray without evidence of acute cardiopulmonary process, but with chronic interstitial lung changes.  Patient was given 2 nebulizer treatments, magnesium, IV Solu-Medrol, and placed on oxygen therapy.  We were asked admit for further management.    Treated initially with IV steroids, nebulizer treatments, and oxygen therapy.  I had ordered a 2D echocardiogram to evaluate for elevated right-sided pressures, for which he does have evidence of LVH, moderate dilation of the right ventricle with borderline right ventricular function, questionable acute on chronic cor pulmonale, PASP of 45.  Cardiology and pulmonology was consulted.    Review of Systems:     General ROS: Patient denies any fevers, chills or loss of consciousness.  Respiratory ROS: Cough and shortness of breath.  Cardiovascular ROS: Denies chest pain or palpitations. No history of exertional chest pain.  Gastrointestinal ROS: Denies nausea and vomiting. Denies any abdominal pain. No diarrhea.  Neurological ROS: Denies any focal weakness. No loss of consciousness. Denies any numbness.  Dermatological ROS: Denies any redness or pruritis.    Vital Signs:    Temp:  [97.4 °F (36.3 °C)-98.5 °F (36.9 °C)] 98.3 °F (36.8 °C)  Heart Rate:  [77-98] 78  Resp:  [16-22] 16  BP: (134-168)/(71-94) 146/77    Intake and output:    I/O last 3 completed shifts:  In: 2257 [P.O.:720; I.V.:1537]  Out: 1550 [Urine:1550]  I/O this shift:  In: 958 [P.O.:360; I.V.:598]  Out: 550 [Urine:550]    Physical Examination:    General Appearance:  Alert and cooperative, not in any acute distress.   Head:  Atraumatic and normocephalic, without obvious abnormality.   Eyes:          PERRLA, conjunctivae and sclerae normal, no Icterus. No pallor. Extraocular movements are within normal limits.   Neck: Supple, trachea midline, no thyromegaly.   Lungs:   Breath sounds heard bilaterally equally.  Still  with inspiratory and expiratory wheezes present.   Heart:  Normal S1 and S2, no murmur, no gallop, no rub. No JVD   Abdomen:   Normal bowel sounds, no masses, no organomegaly. Soft, non-tender, non-distended, no guarding, no rebound tenderness.  Obese   Extremities: Moves all extremities well, no edema, no cyanosis, no clubbing.   Skin: No bleeding, bruising or rash.   Neurologic: Awake, alert and oriented times 3. Moves all 4 extremities equally.     Laboratory results:    Results from last 7 days   Lab Units 12/11/19  0559 12/10/19  1045   SODIUM mmol/L 134* 135*   POTASSIUM mmol/L 3.8 3.4*   CHLORIDE mmol/L 94* 93*   CO2 mmol/L 28.2 29.4*   BUN mg/dL 8 7*   CREATININE mg/dL 0.66* 0.82   CALCIUM mg/dL 8.7 9.7   BILIRUBIN mg/dL 0.4 0.9   ALK PHOS U/L 72 81   ALT (SGPT) U/L 11 13   AST (SGOT) U/L 12 14   GLUCOSE mg/dL 211* 154*     Results from last 7 days   Lab Units 12/11/19  0559 12/10/19  1045   WBC 10*3/mm3 12.86* 8.80   HEMOGLOBIN g/dL 19.1* 20.1*   HEMATOCRIT % 55.2* 56.5*   PLATELETS 10*3/mm3 180 200         Results from last 7 days   Lab Units 12/10/19  1045   TROPONIN T ng/mL <0.010           I have reviewed the patient's laboratory results.    Radiology results:    Imaging Results (Last 24 Hours)     ** No results found for the last 24 hours. **          I have reviewed the patient's radiology reports.    Medication Review:     I have reviewed the patients active and prn medications.     Assessment/Plan:     DOMINIQUE and COPD overlap syndrome (CMS/HCC)- (present on admission)   Suspect combination is contributing to overall hypoxia and exertional dyspnea.  He would likely need a sleep study at some point.  2D echo with evidence of elevated right-sided pressures in addition to likely acute on chronic cor pulmonale.  Cardiology has been consulted.  Does recommend a CT PE to rule out pulmonary embolism.    Essential hypertension- (present on admission)  Patient previously on hydrochlorothiazide and he takes Lasix  40 mg on a as needed basis.  Denies any history of cardiac disease.  Will monitor blood pressure.    Type 2 diabetes mellitus, without long-term current use of insulin (CMS/[facility])- (present on admission)   Restarted home medications..  Will have sliding scale insulin coverage as anticipate some increase with steroids.  Carbohydrate consistent diet otherwise.    Secondary polycythemia- (present on admission)  Likely related to underlying severe COPD in setting of ongoing tobacco abuse and DOMINIQUE.    COPD with acute exacerbation (CMS/[facility])- (present on admission)   Patient requiring approximately 3 L nasal cannula.  He is being treated with IV steroids, duo nebs, and oxygen.  With echocardiogram findings, I have asked pulmonology to see him for further management.    Patient does meet inpatient criteria at this point.  He has ongoing hypoxia despite initial COPD management and with new echocardiogram findings concerning for acute on chronic cor pulmonale.  Anticipate another 1 to 2-day stay at the minimum.  VT prophylaxis in place.    Yane Singletary,   12/11/19  2:33 PM    Dictated utilizing Dragon dictation.

## 2019-12-11 NOTE — PROGRESS NOTES
Discharge Planning Assessment  [facility]     Patient Name: Stephane Jensen  MRN: [id number]  Today's Date: 12/11/2019    Admit Date: 12/10/2019    Discharge Needs Assessment     Row Name 12/11/19 1113       Living Environment    Lives With  alone    Current Living Arrangements  home/apartment/condo    Primary Care Provided by  self    Provides Primary Care For  no one    Family Caregiver if Needed  none    Able to Return to Prior Arrangements  yes       Resource/Environmental Concerns    Resource/Environmental Concerns  financial    Financial Concerns  insurance, inadequate;food, unable to afford    Transportation Concerns  car, none       Transition Planning    Patient/Family Anticipates Transition to  home    Patient/Family Anticipated Services at Transition  none    Transportation Anticipated  car, drives self       Discharge Needs Assessment    Readmission Within the Last 30 Days  no previous admission in last 30 days    Concerns to be Addressed  basic needs;discharge planning;financial/insurance    Equipment Currently Used at Home  none        Discharge Plan     Row Name 12/11/19 1115       Plan    Plan  Home     Provided post acute provider list?  Yes    Plan Comments  Case Management spoke to pt regarding discharge plans Confirmed address and phone number  He lives alone his wife past away 8 months ago  He drove to the hospital  Reports difficulty with medical bills food at the end of the month  Social service consult made He has a step daughter Lori Bean does not have her number at this time  Does not have any equipment  at home         Destination      Coordination has not been started for this encounter.      Durable Medical Equipment      Coordination has not been started for this encounter.      Dialysis/Infusion      Coordination has not been started for this encounter.      Home Medical Care      Coordination has not been started for this encounter.      Therapy      Coordination has not been  started for this encounter.      Community Resources      Coordination has not been started for this encounter.          Demographic Summary     Row Name 12/11/19 1051       General Information    Admission Type  observation    Referral Source  admission list        Functional Status     Row Name 12/11/19 1058       Functional Status    Usual Activity Tolerance  good       Functional Status, IADL    Medications  independent    Meal Preparation  independent    Housekeeping  independent    Laundry  independent    Shopping  independent       Mental Status    General Appearance WDL  WDL        Psychosocial    No documentation.       Abuse/Neglect    No documentation.       Legal     Row Name 12/11/19 1112       Financial/Legal    Finance Comments  has some difficulty with medical bills food t the end of the month social service consult placed         Substance Abuse    No documentation.       Patient Forms    No documentation.           Luisa Cunningham RN

## 2019-12-11 NOTE — CONSULTS
Date of consultation:   December 11, 2019    Requested by:   Hospitalist Service.     PCP: Leticia Whittaker APRN    Reason:  Acute respiratory failure.  COPD exacerbation.  Obstructive sleep apnea?    History of Present Illness:  59 y.o. male  with past medical history significant for COPD who started complaining of shortness of breath, cough and phlegm production for the past few days. Patient was not complaining of subjective chills.  The patient says that his symptoms started somewhat slowly. he says that the shortness of breath is worse than baseline.  Shortness of breath seems to be worse with exertion & even at rest.    he denies any sick contacts.     he was using his medications regularly at home.  The patient says that he also started using rescue medication more frequently without any significant improvement.    Upon questioning, he is complaining of sleep disturbance. Patient says that for the past few years, he has had trouble with snoring. Patient has also been told that he sometimes quits breathing at night.       Patient says that he feels tired in the morning after waking up. he is also complaining of usually feeling sleepy while watching TV and sometimes while reading a book.    Patient also mentions nights when he remembers having nightmares and has woken up due to the same.  Patient's sleep schedule was reviewed.     He was found to have an O2 saturation in the mid 80s on room air, and had to be started on 2 L nasal cannula.    The patient's symptoms continued to worsen and he was admitted to the hospital and pulmonary Consultation was requested for further recommendations.     Review of System: All other review of systems negative except indicated in HPI.    Past Medical History:  Past Medical History:   Diagnosis Date   • Arthritis    • COPD (chronic obstructive pulmonary disease) (CMS/[facility])    • Hypertension    • skin cancer    • Sleep apnea          Past Surgical History:  Past Surgical History:  "  Procedure Laterality Date   • ADENOIDECTOMY           Family History:  History reviewed. No pertinent family history.      Social History:  Social History     Socioeconomic History   • Marital status:      Spouse name: Not on file   • Number of children: Not on file   • Years of education: Not on file   • Highest education level: Not on file   Tobacco Use   • Smoking status: Current Every Day Smoker   • Smokeless tobacco: Never Used   Substance and Sexual Activity   • Alcohol use: No     Comment: quit 20 years ago   • Drug use: Yes     Types: Marijuana     Comment: quit suboxone 2 years ago   • Sexual activity: Defer         Physical Exam:  /71 (BP Location: Left arm, Patient Position: Lying)   Pulse 79   Temp 98.4 °F (36.9 °C) (Oral)   Resp 18   Ht 190.5 cm (75\")   Wt 125 kg (274 lb 9.6 oz)   SpO2 94%   BMI 34.32 kg/m²     Constitutional:            Vital signs reviewed                     General: Mild respiratory distress noted.    Eyes:            Extraocular movement was intact.            Pupils appeared equal            Conjunctiva: Pink    ENT:             Hearing was intact             No nasal erythema noted.              Oropharynx was crowded. No lesions noted.     Neck:             Supple. No JVD noted.              Thyroid gland did not seem to be enlarged    Cardiovascular:              S1 + S2. Regular     Lungs/Respiratory:            Respiratory effort was mildly labored.              Hyperresonance to percussion.            Decreased Air Entry Bilaterally with mild to moderate wheezing.    Abdomen/GI:            Obese. Soft.  Bowel sounds were positive.  No obvious organomegaly.    Musculoskeletal/Extremities:            Trace to 1+ edema noted.            No cyanosis noted            No clubbing noted            Gait could not be assessed at this time.    Neurologic:             Awake, alert and oriented x 3.             Able to follow simple commands.    Psychiatric:        "      Affect appeared fair.             Awake, alert and oriented x 3.    Skin:             No rash noted.             Warm and dry        Labs: Reviewed. Pertinent labs were noted.     Lab Results   Component Value Date    WBC 12.86 (H) 12/11/2019    HGB 19.1 (H) 12/11/2019    HCT 55.2 (H) 12/11/2019    MCV 99.1 (H) 12/11/2019     12/11/2019       Lab Results   Component Value Date    GLUCOSE 211 (H) 12/11/2019    CALCIUM 8.7 12/11/2019     (L) 12/11/2019    K 3.8 12/11/2019    CO2 28.2 12/11/2019    CL 94 (L) 12/11/2019    BUN 8 12/11/2019    CREATININE 0.66 (L) 12/11/2019    EGFRIFNONA 123 12/11/2019    BCR 12.1 12/11/2019    ANIONGAP 11.8 12/11/2019    PROTEINTOT 7.5 12/11/2019    ALBUMIN 4.20 12/11/2019       Lab Results   Component Value Date    PROBNP 105.1 12/10/2019    PROBNP 143.0 (C) 02/11/2017       No results found for: INR    Lab Results   Component Value Date    PROCALCITO 0.04 (L) 12/10/2019       No results found for: CRP      ABG: Reviewed  Lab Results   Component Value Date    PHART 7.430 12/10/2019    ENB4FXS 51.7 (H) 12/10/2019    PO2ART 61.6 (L) 12/10/2019    HGBBG 20.0 (H) 12/10/2019    C1GBVCIJ 92.1 (L) 12/10/2019    FCOHB 4.6 11/18/2015    CARBOXYHGB 3.9 (H) 12/10/2019    FMETHB 0.6 11/18/2015         Pharmacy to Dose enoxaparin (LOVENOX)        Imaging Study: Latest imaging studies was reviewed personally.   Imaging Results (Last 72 Hours)     Procedure Component Value Units Date/Time    XR Chest 2 View [[id number]] Collected:  12/10/19 1108     Updated:  12/10/19 1111    Narrative:       PROCEDURE: XR CHEST 2 VW-        HISTORY: SOA Triage Protocol     COMPARISON: 02/11/2017.     FINDINGS: The heart is normal in size. The mediastinum is unremarkable.  There are chronic interstitial lung changes. No focal opacities were  effusions are evident. There is no pneumothorax. There are no acute  osseous abnormalities.       Impression:       No acute cardiopulmonary process.          "  This report was finalized on 12/10/2019 11:09 AM by Esteban Briseno M.D..          ECHO:  Results for orders placed during the hospital encounter of 12/10/19   Adult Transthoracic Echo Complete With Contrast if Necessary Per Protocol    Narrative Technically very limited study   1) Borderline LVH with normal LV systolic function ( EF is over 55%)  2) Mild left atrial enlargement   3) Mild MR   4) Moderate dilation of the RV with borderline RV function   5) Wall motion abnormality of the RV - rule out acute on chronic   corpulmonale  6) Mild TR with PAsp of 45 mm of hg   7) Calcified mildly restricted non coronary cusp of the aortic valve with   no evidence for stenosis          Assessment:  1.  Acute respiratory failure.  2.  Acute Exacerbation of COPD.  3.  DOMINIQUE.  4.  Smoking.  5.  Obesity.     Discussion/Recommendations:   I have adjusted the nebulized treatments as appropriate.     Will try BiPAP tonight.     We will also adjust the dose of steroids.    The patient will definitely need outpatient sleep study, since diagnosis sleep apnea will need to be established once again.    The patient also is not on long-acting muscarinic agent, as he says that the \"insurance stopped paying for it\" which may be the reason for his exacerbation.    He was strongly advised to quit smoking.    Weight loss was also discussed.    I have asked the nursing staff to keep the patient on room air to assess any ongoing need to continue oxygen.    Recommendations were also discussed with the referring provider.     I would like to thank you for the opportunity to participate in the care of this patient.  We will communicate changes and recommendations, if and when necessary.      This document was electronically signed by Suzanne Morales MD on 12/11/19 at 9:40 AM      Dictated utilizing Dragon dictation.        "

## 2019-12-11 NOTE — CONSULTS
Continued Stay Note  [facility]     Patient Name: Stephane Jensen  MRN: [id number]  Today's Date: 12/11/2019    Admit Date: 12/10/2019    Discharge Plan     Row Name 12/11/19 1252       Plan    Plan Social Service consult due to resource needs and Medicare Extra Help information    Plan Comments SW met with pt in room alone due to consult placed by CM, Luisa TELLEZ. Pt informs SW that he receives food stamps each month, only $16.00 and draws around $1,100 from [employer]. Pt states he has transportation, but would like information on resources around the area and medication extra help. Pt states he has been to [facility] office for extra help. SW informed pt that medicare extra help is through the social security office, pt voiced understanding. CECILIA provided pt with contact info for  office in [place] and medicare extra help program. Pt plans to go by the  office and sign up in person. CECILIA also provided pt with [place] [facility] information, [facility] Resource booklet and food cooper in the area. Pt appreciative of resources provided and plans to reach out to [facility] today and will go by the  office when he is discharged. CECILIA also provided pt with smoking cessation information from [facility]. No further needs from CECILIA, all questions answered and contact info provided. CM will continue to follow for dcp.              FLORECITA Sutton    12:57 PM  12/11/19

## 2019-12-12 VITALS
SYSTOLIC BLOOD PRESSURE: 131 MMHG | TEMPERATURE: 97.6 F | RESPIRATION RATE: 18 BRPM | DIASTOLIC BLOOD PRESSURE: 64 MMHG | HEIGHT: 75 IN | WEIGHT: 274.6 LBS | OXYGEN SATURATION: 94 % | HEART RATE: 93 BPM | BODY MASS INDEX: 34.14 KG/M2

## 2019-12-12 PROBLEM — J96.01 ACUTE RESPIRATORY FAILURE WITH HYPOXIA: Status: ACTIVE | Noted: 2019-12-12

## 2019-12-12 LAB
ALBUMIN SERPL-MCNC: 4 G/DL (ref 3.5–5.2)
ALBUMIN/GLOB SERPL: 1.3 G/DL
ALP SERPL-CCNC: 64 U/L (ref 39–117)
ALT SERPL W P-5'-P-CCNC: 12 U/L (ref 1–41)
ANION GAP SERPL CALCULATED.3IONS-SCNC: 12.1 MMOL/L (ref 5–15)
AST SERPL-CCNC: 15 U/L (ref 1–40)
BASOPHILS # BLD AUTO: 0.04 10*3/MM3 (ref 0–0.2)
BASOPHILS NFR BLD AUTO: 0.2 % (ref 0–1.5)
BILIRUB SERPL-MCNC: 0.3 MG/DL (ref 0.2–1.2)
BUN BLD-MCNC: 18 MG/DL (ref 8–23)
BUN/CREAT SERPL: 22.5 (ref 7–25)
CALCIUM SPEC-SCNC: 8.9 MG/DL (ref 8.6–10.5)
CHLORIDE SERPL-SCNC: 96 MMOL/L (ref 98–107)
CO2 SERPL-SCNC: 28.9 MMOL/L (ref 22–29)
CREAT BLD-MCNC: 0.8 MG/DL (ref 0.76–1.27)
DEPRECATED RDW RBC AUTO: 48.6 FL (ref 37–54)
EOSINOPHIL # BLD AUTO: 0 10*3/MM3 (ref 0–0.4)
EOSINOPHIL NFR BLD AUTO: 0 % (ref 0.3–6.2)
ERYTHROCYTE [DISTWIDTH] IN BLOOD BY AUTOMATED COUNT: 13.2 % (ref 12.3–15.4)
GFR SERPL CREATININE-BSD FRML MDRD: 99 ML/MIN/1.73
GLOBULIN UR ELPH-MCNC: 3 GM/DL
GLUCOSE BLD-MCNC: 183 MG/DL (ref 65–99)
GLUCOSE BLDC GLUCOMTR-MCNC: 157 MG/DL (ref 70–130)
GLUCOSE BLDC GLUCOMTR-MCNC: 158 MG/DL (ref 70–130)
HCT VFR BLD AUTO: 52.8 % (ref 37.5–51)
HGB BLD-MCNC: 18 G/DL (ref 13–17.7)
IMM GRANULOCYTES # BLD AUTO: 0.08 10*3/MM3 (ref 0–0.05)
IMM GRANULOCYTES NFR BLD AUTO: 0.5 % (ref 0–0.5)
LYMPHOCYTES # BLD AUTO: 0.81 10*3/MM3 (ref 0.7–3.1)
LYMPHOCYTES NFR BLD AUTO: 4.7 % (ref 19.6–45.3)
MCH RBC QN AUTO: 33.8 PG (ref 26.6–33)
MCHC RBC AUTO-ENTMCNC: 34.1 G/DL (ref 31.5–35.7)
MCV RBC AUTO: 99.2 FL (ref 79–97)
MONOCYTES # BLD AUTO: 0.58 10*3/MM3 (ref 0.1–0.9)
MONOCYTES NFR BLD AUTO: 3.3 % (ref 5–12)
NEUTROPHILS # BLD AUTO: 15.83 10*3/MM3 (ref 1.7–7)
NEUTROPHILS NFR BLD AUTO: 91.3 % (ref 42.7–76)
NRBC BLD AUTO-RTO: 0 /100 WBC (ref 0–0.2)
PLATELET # BLD AUTO: 201 10*3/MM3 (ref 140–450)
PMV BLD AUTO: 10 FL (ref 6–12)
POTASSIUM BLD-SCNC: 4.1 MMOL/L (ref 3.5–5.2)
PROT SERPL-MCNC: 7 G/DL (ref 6–8.5)
RBC # BLD AUTO: 5.32 10*6/MM3 (ref 4.14–5.8)
SODIUM BLD-SCNC: 137 MMOL/L (ref 136–145)
WBC NRBC COR # BLD: 17.34 10*3/MM3 (ref 3.4–10.8)

## 2019-12-12 PROCEDURE — 82962 GLUCOSE BLOOD TEST: CPT

## 2019-12-12 PROCEDURE — 94799 UNLISTED PULMONARY SVC/PX: CPT

## 2019-12-12 PROCEDURE — 94660 CPAP INITIATION&MGMT: CPT

## 2019-12-12 PROCEDURE — 94618 PULMONARY STRESS TESTING: CPT

## 2019-12-12 PROCEDURE — 99232 SBSQ HOSP IP/OBS MODERATE 35: CPT | Performed by: INTERNAL MEDICINE

## 2019-12-12 PROCEDURE — 25010000002 METHYLPREDNISOLONE PER 80 MG: Performed by: INTERNAL MEDICINE

## 2019-12-12 PROCEDURE — 80053 COMPREHEN METABOLIC PANEL: CPT | Performed by: FAMILY MEDICINE

## 2019-12-12 PROCEDURE — 85025 COMPLETE CBC W/AUTO DIFF WBC: CPT | Performed by: FAMILY MEDICINE

## 2019-12-12 PROCEDURE — 63710000001 INSULIN ASPART PER 5 UNITS: Performed by: FAMILY MEDICINE

## 2019-12-12 PROCEDURE — 99238 HOSP IP/OBS DSCHRG MGMT 30/<: CPT | Performed by: FAMILY MEDICINE

## 2019-12-12 RX ORDER — NICOTINE 21 MG/24HR
1 PATCH, TRANSDERMAL 24 HOURS TRANSDERMAL EVERY 24 HOURS
Qty: 28 PATCH | Refills: 0 | Status: SHIPPED | OUTPATIENT
Start: 2019-12-12

## 2019-12-12 RX ORDER — METHYLPREDNISOLONE ACETATE 80 MG/ML
80 INJECTION, SUSPENSION INTRA-ARTICULAR; INTRALESIONAL; INTRAMUSCULAR; SOFT TISSUE ONCE
Status: COMPLETED | OUTPATIENT
Start: 2019-12-12 | End: 2019-12-12

## 2019-12-12 RX ORDER — BENZONATATE 200 MG/1
200 CAPSULE ORAL 3 TIMES DAILY PRN
Qty: 30 CAPSULE | Refills: 0 | OUTPATIENT
Start: 2019-12-12 | End: 2021-01-21

## 2019-12-12 RX ORDER — LISINOPRIL 10 MG/1
10 TABLET ORAL
Qty: 30 TABLET | Refills: 0 | Status: SHIPPED | OUTPATIENT
Start: 2019-12-13 | End: 2022-11-08 | Stop reason: SDUPTHER

## 2019-12-12 RX ORDER — DOXYCYCLINE HYCLATE 100 MG/1
100 CAPSULE ORAL 2 TIMES DAILY
Qty: 10 CAPSULE | Refills: 0 | OUTPATIENT
Start: 2019-12-12 | End: 2021-01-21

## 2019-12-12 RX ORDER — ALBUTEROL SULFATE 2.5 MG/3ML
2.5 SOLUTION RESPIRATORY (INHALATION) EVERY 4 HOURS PRN
Qty: 90 ML | Refills: 2 | Status: SHIPPED | OUTPATIENT
Start: 2019-12-12

## 2019-12-12 RX ADMIN — GABAPENTIN 600 MG: 300 CAPSULE ORAL at 05:44

## 2019-12-12 RX ADMIN — BUPRENORPHINE HYDROCHLORIDE, NALOXONE HYDROCHLORIDE 1 FILM: 4; 1 FILM, SOLUBLE BUCCAL; SUBLINGUAL at 09:20

## 2019-12-12 RX ADMIN — LISINOPRIL 10 MG: 10 TABLET ORAL at 09:19

## 2019-12-12 RX ADMIN — GUAIFENESIN 600 MG: 600 TABLET, EXTENDED RELEASE ORAL at 05:44

## 2019-12-12 RX ADMIN — SODIUM CHLORIDE, PRESERVATIVE FREE 10 ML: 5 INJECTION INTRAVENOUS at 09:20

## 2019-12-12 RX ADMIN — IPRATROPIUM BROMIDE AND ALBUTEROL SULFATE 3 ML: .5; 3 SOLUTION RESPIRATORY (INHALATION) at 00:08

## 2019-12-12 RX ADMIN — HYDROCHLOROTHIAZIDE 25 MG: 25 TABLET ORAL at 09:20

## 2019-12-12 RX ADMIN — DIAZEPAM 10 MG: 5 TABLET ORAL at 09:19

## 2019-12-12 RX ADMIN — IPRATROPIUM BROMIDE AND ALBUTEROL SULFATE 3 ML: .5; 3 SOLUTION RESPIRATORY (INHALATION) at 07:05

## 2019-12-12 RX ADMIN — BUDESONIDE AND FORMOTEROL FUMARATE DIHYDRATE 2 PUFF: 160; 4.5 AEROSOL RESPIRATORY (INHALATION) at 07:05

## 2019-12-12 RX ADMIN — INSULIN ASPART 2 UNITS: 100 INJECTION, SOLUTION INTRAVENOUS; SUBCUTANEOUS at 12:20

## 2019-12-12 RX ADMIN — METHYLPREDNISOLONE ACETATE 80 MG: 80 INJECTION, SUSPENSION INTRA-ARTICULAR; INTRALESIONAL; INTRAMUSCULAR; SOFT TISSUE at 09:33

## 2019-12-12 RX ADMIN — POTASSIUM CHLORIDE 10 MEQ: 750 CAPSULE, EXTENDED RELEASE ORAL at 09:19

## 2019-12-12 RX ADMIN — GLIPIZIDE 5 MG: 5 TABLET ORAL at 06:36

## 2019-12-12 RX ADMIN — BUPRENORPHINE HYDROCHLORIDE, NALOXONE HYDROCHLORIDE 1 FILM: 8; 2 FILM, SOLUBLE BUCCAL; SUBLINGUAL at 09:20

## 2019-12-12 RX ADMIN — LINAGLIPTIN 5 MG: 5 TABLET, FILM COATED ORAL at 09:19

## 2019-12-12 RX ADMIN — INSULIN ASPART 2 UNITS: 100 INJECTION, SOLUTION INTRAVENOUS; SUBCUTANEOUS at 06:36

## 2019-12-12 RX ADMIN — MICONAZOLE NITRATE: 2 POWDER TOPICAL at 09:21

## 2019-12-12 RX ADMIN — DOXYCYCLINE 100 MG: 100 INJECTION, POWDER, LYOPHILIZED, FOR SOLUTION INTRAVENOUS at 04:58

## 2019-12-12 RX ADMIN — BENZONATATE 200 MG: 100 CAPSULE ORAL at 06:46

## 2019-12-12 RX ADMIN — SERTRALINE HYDROCHLORIDE 100 MG: 50 TABLET ORAL at 09:19

## 2019-12-12 NOTE — ASSESSMENT & PLAN NOTE
Present on admission with notable hypoxia requiring submental oxygen and likely secondary to COPD exacerbation with cor pulmonale.  Has improved with treatment.

## 2019-12-12 NOTE — PROGRESS NOTES
"  CC: Acute respiratory failure.  COPD exacerbation.  Obstructive sleep apnea    S: Tried the BiPAP last night.  Feels that the nasal mask was easier to tolerate but he is started having air leak when he opened his mouth.  He does feel better as far as shortness of breath is concerned.  Continues to have mild cough, better than 2 days ago.    ROS: Positive for mild cough, mild shortness of breath. Denies chest pain, diarrhea or fever.    O: /81 (BP Location: Right arm, Patient Position: Lying)   Pulse 71   Temp 97.7 °F (36.5 °C) (Oral)   Resp 16   Ht 190.5 cm (75\")   Wt 125 kg (274 lb 9.6 oz)   SpO2 93%   BMI 34.32 kg/m²     Vital signs reviewed.  General/Constitutional: No respiratory distress noted.  Neck: No JVD   Cardiovascular: S1 + S2. Reg.   Lungs/Respiratory: Mild scattered wheezing heard. No crackles noted  Musculoskeletal/Extremities: No edema noted.  Neurologic: AAOx3. Was able to follow commands     Labs: Reviewed.   Results from last 7 days   Lab Units 12/12/19  0536 12/11/19  0559 12/10/19  1045   SODIUM mmol/L 137 134* 135*   POTASSIUM mmol/L 4.1 3.8 3.4*   CHLORIDE mmol/L 96* 94* 93*   CO2 mmol/L 28.9 28.2 29.4*   BUN mg/dL 18 8 7*   CREATININE mg/dL 0.80 0.66* 0.82   CALCIUM mg/dL 8.9 8.7 9.7   GLUCOSE mg/dL 183* 211* 154*       Results from last 7 days   Lab Units 12/10/19  1045   MAGNESIUM mg/dL 1.8       Results from last 7 days   Lab Units 12/12/19  0536 12/11/19  0559 12/10/19  1045   WBC 10*3/mm3 17.34* 12.86* 8.80   NEUTROPHIL % % 91.3* 91.8* 67.8   HEMOGLOBIN g/dL 18.0* 19.1* 20.1*   HEMATOCRIT % 52.8* 55.2* 56.5*   PLATELETS 10*3/mm3 201 180 200           Lab Results   Component Value Date    PROCALCITO 0.04 (L) 12/10/2019       Lab Results   Component Value Date    PROBNP 105.1 12/10/2019    PROBNP 143.0 (C) 02/11/2017       No results found for: CRP      Micro: As of December 12, 2019   Lab Results   Component Value Date    RESPCX Moderate growth (3+) Normal Respiratory Gayle " 12/10/2019         Pharmacy to Dose enoxaparin (LOVENOX)        CXRay: Latest imaging study was reviewed personally.   Imaging Results (Last 24 Hours)     Procedure Component Value Units Date/Time    CT Chest Pulmonary Embolism [[id number]] Collected:  12/11/19 1453     Updated:  12/11/19 1457    Narrative:       PROCEDURE: CT CHEST PULMONARY EMBOLISM-     HISTORY: Chest pain, acute, PE suspected, high pretest prob;  J44.1-Chronic obstructive pulmonary disease with (acute) exacerbation;  R09.02-Hypoxemia     COMPARISON: None .     TECHNIQUE: Multiple axial CT images were obtained from the thoracic  inlet through the upper abdomen following the administration of Isovue  300 per the CT PE protocol. Coronal and oblique 3D MIP images were  reconstructed from the original axial data set.      FINDINGS: There are no filling defects within the pulmonary arteries to  suggest an embolus. The thoracic aorta is normal in caliber with no  evidence of dissection. The heart is normal in size. There are no  pleural or pericardial effusions. There is no adenopathy.  Lung windows  reveal no focal opacities or suspicious pulmonary nodules. The  visualized upper abdomen is unremarkable. Bone windows reveal no acute  osseous abnormalities.       Impression:       No evidence of pulmonary embolus or dissection.            557.68 mGy.cm        This study was performed with techniques to keep radiation doses as low  as reasonably achievable (ALARA). Individualized dose reduction  techniques using automated exposure control or adjustment of mA and/or  kV according to the patient size were employed.      This report was finalized on 12/11/2019 2:55 PM by Esteban Briseno M.D..          Assessment & Recommendations/Plan:   1.  Acute respiratory failure  Currently on room air.  We will order a 6-minute walk test.    2.  Acute exacerbation of COPD  We will administer Depo-Medrol.  We will add Spiriva, to the Breo that he is already on on an  outpatient basis.  He will need outpatient PFTs.    3.  Obstructive sleep apnea  I will order in lab study, given the fact that he has underlying COPD.    4.  Smoking  Once again advised to quit smoking.    Upon discharge, the patient can be followed by our clinic in 6 to 8 weeks.  Sleep study will need to be performed before the follow-up.  PFTs will be done on the day of follow-up.    We have reviewed patient's current orders and changes, if any, have been suggested to primary care team. Plan was also discussed with nursing staff, as necessary.       This document was electronically signed by Suzanne Morales MD on 12/12/19 at 9:15 AM      Dictated utilizing Dragon dictation.

## 2019-12-12 NOTE — DISCHARGE SUMMARY
[facility]   DISCHARGE SUMMARY      Name:  Stephane Jensen   Age:  59 y.o.  Sex:  male  :  1960  MRN:  [id number]   Visit Number:  [id number]    Admission Date:  12/10/2019  Date of Discharge:  2019  Primary Care Physician:  Leticia Whittaker APRN    Important issues to note:    Patient will have follow-up with pulmonology in addition to having sleep study.  He did not qualify for home oxygen at this time.    Discharge Diagnoses:       Active Hospital Problems:  Acute respiratory failure with hypoxia (CMS/HCC)- (present on admission)  Present on admission with notable hypoxia requiring submental oxygen and likely secondary to COPD exacerbation with cor pulmonale.  Has improved with treatment.    DOMINIQUE and COPD overlap syndrome (CMS/HCC)- (present on admission)   Suspect combination is contributing to overall hypoxia and exertional dyspnea.  He would likely need a sleep study at some point.  2D echo with evidence of elevated right-sided pressures in addition to likely acute on chronic cor pulmonale.  Cardiology has been consulted.  CT PE ordered without evidence of PE.  Dr. Roman does recommend follow-up with him as he may have underlying CAD which needs to be addressed in the future.    Essential hypertension- (present on admission)  Blood pressure improved with HCTZ in addition to lisinopril.  Will be continued upon discharge.    Type 2 diabetes mellitus, without long-term current use of insulin (CMS/HCC)- (present on admission)  Restart home medications.  A1c is 6.7.    Secondary polycythemia- (present on admission)  Likely related to underlying severe COPD in setting of ongoing tobacco abuse and DOMINIQUE.    COPD with acute exacerbation (CMS/HCC)- (present on admission)   Patient much improved.  Still with mild wheezing.  Was given Depo-Medrol injection today.  Apparently has had issues with taking prednisone in the past.  Advise use of nebulizer treatments in addition to Breo  and Incruse started by pulmonology.  He will have follow-up appointments with pulmonology upon discharge.      Resolved Hospital Problems:  No notes have been filed under this hospital service.  Service: Hospitalist        Presenting Problem:    COPD with acute exacerbation (CMS/[facility]) [J44.1]  COPD with acute exacerbation (CMS/[facility]) [J44.1]     Consults:     Consults     Date and Time Order Name Status Description    12/11/2019 0938 Inpatient Cardiology Consult      12/11/2019 0938 Inpatient Pulmonology Consult Completed         Consulting Physician(s)     Provider Relationship Specialty    Suzanne Morales MD Consulting Physician Pulmonary Disease    Yane Singletary DO Consulting Physician Hospitalist    Freedom Roman MD Consulting Physician Interventional Cardiology          Procedures Performed:       None    History of presenting illness:    Patient feels well this morning.  He is hoping to go home today.  He has been off of oxygen.  He did not require home oxygen with walking pulse oximetry.  I reviewed with him the importance of taking his medications as directed.  I reviewed his importance of following up with his pulmonology and cardiology appointments.  Reviewed tobacco cessation.    Hospital Course:    Patient is a 59-year-old gentleman with longstanding history of COPD and tobacco abuse who presents with complaints of shortness of breath and cough.  Patient with medical history of COPD, DOMINIQUE, obesity, hypertension, type 2 diabetes, substance abuse, and ongoing tobacco abuse.  Patient states that he has had shortness of breath for a long time, however over the last week he has had increased sputum production with green color in addition to significant shortness of breath.  He is very winded with minimal exertion.  Continues to smoke at least a pack of cigarettes a day.  He does have a history of substance abuse, he is on Suboxone therapy and follows with psychiatric services.  He denies  chest pains or palpitations.  He denies lower extremity swelling.  He states he is on Lasix on a as needed basis.  He denies cardiac history.     In the ER, patient presented hypertensive, tachypneic, and afebrile.  He was satting in the upper 80s on room air and was placed on 2 L nasal cannula.  Procalcitonin 0.04, white blood cell count 8800, hemoglobin of 20, hematocrit 56.5.  Troponin negative.  proBNP 105.  Creatinine 0.82, potassium 3.4 ABG with pH 7.43, PCO2 51, PO2 of 61 on 2 L nasal cannula.  Chest x-ray without evidence of acute cardiopulmonary process, but with chronic interstitial lung changes.  Patient was given 2 nebulizer treatments, magnesium, IV Solu-Medrol, and placed on oxygen therapy.  We were asked admit for further management.     Treated initially with IV steroids, nebulizer treatments, and oxygen therapy.  I had ordered a 2D echocardiogram to evaluate for elevated right-sided pressures, for which he does have evidence of LVH, moderate dilation of the right ventricle with borderline right ventricular function, questionable acute on chronic cor pulmonale, PASP of 45.  Had negative CT PE for pulmonary embolism. Cardiology and pulmonology was consulted.    Patient did well with treatment of COPD exacerbation.  He did not require home oxygen at day of discharge.  He will be discharged with albuterol nebulizer treatments, Brio Ellipta, and Incruse Ellipta.  He will be continued on doxycycline for the next 5 days.  He has follow-up appointments with pulmonology and cardiology.  He will need a sleep study and will likely need BiPAP in the future.  He was also given prescription for NicoDerm patches and tobacco cessation was reiterated multiple times.  Plan of care was discussed with patient who is in agreement.  All questions answered.    Vital Signs:    Temp:  [97.5 °F (36.4 °C)-98.5 °F (36.9 °C)] 97.6 °F (36.4 °C)  Heart Rate:  [68-93] 93  Resp:  [16-20] 18  BP: (113-159)/(57-81) 131/64    Physical  Exam:    General Appearance:  Alert and cooperative, not in any acute distress.   Head:  Atraumatic and normocephalic, without obvious abnormality.   Eyes:          PERRLA, conjunctivae and sclerae normal, no Icterus. No pallor. Extraocular movements are within normal limits.   Ears:  Ears appear intact with no abnormalities noted.   Throat: No oral lesions, no thrush, oral mucosa moist.   Neck: Supple, trachea midline, no thyromegaly, no carotid bruit.   Back:   No tenderness to palpation,   range of motion normal.   Lungs:   Breath sounds heard bilaterally equally.  Faint expiratory wheezes.   Heart:  Normal S1 and S2, no murmur, no gallop, no rub. No JVD.   Abdomen:   Normal bowel sounds, no masses, no organomegaly. Soft, non-tender, non-distended, no guarding, no rebound tenderness.  Obese   Extremities: Moves all extremities well, no edema, no cyanosis, no clubbing.   Pulses: Pulses palpable and equal bilaterally.   Skin: No bleeding, bruising or rash.   Lymph nodes: No palpable adenopathy.   Neurologic: Alert and oriented x 3. Moves all four limbs equally. No tremors. No facial asymetry.     Pertinent Lab Results:     Results from last 7 days   Lab Units 12/12/19  0536 12/11/19  0559 12/10/19  1045   SODIUM mmol/L 137 134* 135*   POTASSIUM mmol/L 4.1 3.8 3.4*   CHLORIDE mmol/L 96* 94* 93*   CO2 mmol/L 28.9 28.2 29.4*   BUN mg/dL 18 8 7*   CREATININE mg/dL 0.80 0.66* 0.82   CALCIUM mg/dL 8.9 8.7 9.7   BILIRUBIN mg/dL 0.3 0.4 0.9   ALK PHOS U/L 64 72 81   ALT (SGPT) U/L 12 11 13   AST (SGOT) U/L 15 12 14   GLUCOSE mg/dL 183* 211* 154*     Results from last 7 days   Lab Units 12/12/19  0536 12/11/19  0559 12/10/19  1045   WBC 10*3/mm3 17.34* 12.86* 8.80   HEMOGLOBIN g/dL 18.0* 19.1* 20.1*   HEMATOCRIT % 52.8* 55.2* 56.5*   PLATELETS 10*3/mm3 201 180 200         Results from last 7 days   Lab Units 12/10/19  1045   TROPONIN T ng/mL <0.010     Results from last 7 days   Lab Units 12/10/19  1045   PROBNP pg/mL 105.1              Results from last 7 days   Lab Units 12/10/19  1115   PH, ARTERIAL pH units 7.430   PO2 ART mm Hg 61.6*   PCO2, ARTERIAL mm Hg 51.7*   HCO3 ART mmol/L 34.3*           Invalid input(s): USDES,  BLOODU, NITRITITE, BACT, EP  Pain Management Panel     Pain Management Panel Latest Ref Rng & Units 6/20/2016 6/20/2016    AMPHETAMINES SCREEN, URINE NEGATIVE Negative -    BARBITURATES SCREEN NEGATIVE Negative -    BENZODIAZEPINE SCREEN, URINE NEGATIVE PRESUMPTIVE POSITIVE PRESUMPTIVE POSITIVE    COCAINE SCREEN, URINE NEGATIVE Negative -    METHADONE SCREEN, URINE NEGATIVE Negative -              Pertinent Radiology Results:    Imaging Results (All)     Procedure Component Value Units Date/Time    CT Chest Pulmonary Embolism [[id number]] Collected:  12/11/19 1453     Updated:  12/11/19 1457    Narrative:       PROCEDURE: CT CHEST PULMONARY EMBOLISM-     HISTORY: Chest pain, acute, PE suspected, high pretest prob;  J44.1-Chronic obstructive pulmonary disease with (acute) exacerbation;  R09.02-Hypoxemia     COMPARISON: None .     TECHNIQUE: Multiple axial CT images were obtained from the thoracic  inlet through the upper abdomen following the administration of Isovue  300 per the CT PE protocol. Coronal and oblique 3D MIP images were  reconstructed from the original axial data set.      FINDINGS: There are no filling defects within the pulmonary arteries to  suggest an embolus. The thoracic aorta is normal in caliber with no  evidence of dissection. The heart is normal in size. There are no  pleural or pericardial effusions. There is no adenopathy.  Lung windows  reveal no focal opacities or suspicious pulmonary nodules. The  visualized upper abdomen is unremarkable. Bone windows reveal no acute  osseous abnormalities.       Impression:       No evidence of pulmonary embolus or dissection.            557.68 mGy.cm        This study was performed with techniques to keep radiation doses as low  as reasonably achievable  (SRUTHI). Individualized dose reduction  techniques using automated exposure control or adjustment of mA and/or  kV according to the patient size were employed.      This report was finalized on 12/11/2019 2:55 PM by Esteban Briseno M.D..    XR Chest 2 View [[id number]] Collected:  12/10/19 1108     Updated:  12/10/19 1111    Narrative:       PROCEDURE: XR CHEST 2 VW-        HISTORY: SOA Triage Protocol     COMPARISON: 02/11/2017.     FINDINGS: The heart is normal in size. The mediastinum is unremarkable.  There are chronic interstitial lung changes. No focal opacities were  effusions are evident. There is no pneumothorax. There are no acute  osseous abnormalities.       Impression:       No acute cardiopulmonary process.           This report was finalized on 12/10/2019 11:09 AM by Esteban Briseno M.D..          Condition on Discharge:      Stable.    Code status during the hospital stay:    Code Status and Medical Interventions:   Ordered at: 12/10/19 1533     Code Status:    CPR     Medical Interventions (Level of Support Prior to Arrest):    Full       Discharge Disposition:    Home or Self Care    Discharge Medications:       Discharge Medications      New Medications      Instructions Start Date   benzonatate 200 MG capsule  Commonly known as:  TESSALON   200 mg, Oral, 3 Times Daily PRN      doxycycline 100 MG capsule  Commonly known as:  VIBRAMYCIN   100 mg, Oral, 2 Times Daily      glucose blood test strip  Commonly known as:  ONETOUCH VERIO   Use as instructed      lisinopril 10 MG tablet  Commonly known as:  PRINIVIL,ZESTRIL   10 mg, Oral, Every 24 Hours Scheduled   Start Date:  December 13, 2019     nicotine 21 MG/24HR patch  Commonly known as:  NICODERM CQ   1 patch, Transdermal, Every 24 Hours      ONE TOUCH LANCETS misc   1 each, Does not apply, 3 Times Daily PRN      Umeclidinium Bromide 62.5 MCG/INH aerosol powder    1 puff, Inhalation, Daily         Continue These Medications      Instructions  Start Date   BREO ELLIPTA 200-25 MCG/INH inhaler  Generic drug:  Fluticasone Furoate-Vilanterol   1 puff, Inhalation, Daily      diazePAM 10 MG tablet  Commonly known as:  VALIUM   10 mg, Oral, 2 Times Daily      docusate sodium 100 MG capsule  Commonly known as:  COLACE   100 mg, Oral, 2 Times Daily PRN      furosemide 40 MG tablet  Commonly known as:  LASIX   40 mg, Oral, Daily PRN      gabapentin 600 MG tablet  Commonly known as:  NEURONTIN   600 mg, Oral, 3 Times Daily      glipizide 5 MG tablet  Commonly known as:  GLUCOTROL   5 mg, Oral, 2 Times Daily Before Meals      hydroCHLOROthiazide 25 MG tablet  Commonly known as:  HYDRODIURIL   25 mg, Oral, Daily      JANUVIA 100 MG tablet  Generic drug:  SITagliptin   100 mg, Oral, Daily      potassium chloride 10 MEQ CR capsule  Commonly known as:  MICRO-K   10 mEq, Oral, Daily PRN, TAKES WITH LASIX      PROAIR  (90 Base) MCG/ACT inhaler  Generic drug:  albuterol sulfate HFA   2 puffs, Inhalation, Every 4 Hours PRN      albuterol (2.5 MG/3ML) 0.083% nebulizer solution  Commonly known as:  PROVENTIL   2.5 mg, Nebulization, Every 4 Hours PRN      sertraline 100 MG tablet  Commonly known as:  ZOLOFT   100 mg, Oral, Daily      SUBOXONE 8-2 MG film film  Generic drug:  buprenorphine-naloxone   1 film, Sublingual, Daily, 1.5 FILMS/DAY          Stop These Medications    Testosterone Cypionate 200 MG/ML injection  Commonly known as:  DEPOTESTOTERONE CYPIONATE     tiotropium 18 MCG per inhalation capsule  Commonly known as:  SPIRIVA            Discharge Diet:     Diet Instructions     Regular diabetic diet           Carbohydrate consistent    Activity at Discharge:     Activity Instructions     As tolerated           As tolerated    Follow-up Appointments:    Additional Instructions for the Follow-ups that You Need to Schedule     Discharge Follow-up with Specified Provider: Suzanne Morales OR Danita Echeverria.; 6 Weeks   As directed      To:  Suzanne Morales OR Danita  Juwan.    Follow Up:  6 Weeks    Follow Up Details:  Needs PFTs on day of follow up.           Follow-up Information     Suzanne Morales MD Follow up on 1/28/2020.    Specialties:  Pulmonary Disease, Sleep Medicine  Why:  @9:30am  Contact information:  [address]  [address]  [facility] [address]  [phone]             [facility] Follow up on 12/26/2019.    Why:  @ 3:50pm be at office at 3:30pm  Contact information:  [address]   [facility] [address]  [phone]           Freedom Roman MD Follow up in 2 week(s).    Specialties:  Interventional Cardiology, Cardiology  Contact information:  [address]  [facility] [address]  [phone]                   Future Appointments   Date Time Provider Department Center   2/11/2020  9:30 AM [facility] None   2/11/2020 10:20 AM Suzanne Morales MD [facility] None       Additional Instructions for the Follow-ups that You Need to Schedule     Discharge Follow-up with Specified Provider: Suzanne Morales OR Danita Echeverria.; 6 Weeks   As directed      To:  Suzanne Morales OR Danita Echeverria.    Follow Up:  6 Weeks    Follow Up Details:  Needs PFTs on day of follow up.               Test Results Pending at Discharge:     Order Current Status    Green Top (No Gel) In process    Firth Draw In process    Respiratory Culture - Sputum, Oropharynx Preliminary result             Yane Singletary DO  12/12/19  12:43 PM    Time spent: 25 minutes    Dictated utilizing Dragon dictation.

## 2019-12-12 NOTE — PROGRESS NOTES
Exercise Oximetry    Patient Name:Stephane Jensen   MRN: [id number]   Date: 12/12/19             ROOM AIR BASELINE   SpO2% 92   Heart Rate 102   Blood Pressure      EXERCISE ON ROOM AIR SpO2% EXERCISE ON O2 @  LPM SpO2%   1 MINUTE 92 1 MINUTE    2 MINUTES 90 2 MINUTES    3 MINUTES 90 3 MINUTES    4 MINUTES 92 4 MINUTES    5 MINUTES 90 5 MINUTES    6 MINUTES 91 6 MINUTES               Distance Walked  300ft Distance Walked   Dyspnea (Yulia Scale)  1 Dyspnea (Yulia Scale)   Fatigue (Yulia Scale)  1 Fatigue (Yulia Scale)   SpO2% Post Exercise  94 SpO2% Post Exercise   HR Post Exercise  101 HR Post Exercise   Time to Recovery  1 minute Time to Recovery     Comments: Pt did not require oxygen.

## 2019-12-12 NOTE — PHARMACY RECOMMENDATION
* * * IV to PO Conversion * * *    Please consider changing Doxycycline 100 mg q 12 hrs from IV to PO.    Rationale: Pt. received other PO medications within past 48 hrs.    Thank you.    Victor Hugo Mix, Pharm.D.  12/12/19  8:03 AM

## 2019-12-12 NOTE — PROGRESS NOTES
Case Management Discharge Note      Final Note: Discharged home     Provided post acute provider list?: Yes    Destination      No service has been selected for the patient.      Durable Medical Equipment      No service has been selected for the patient.      Dialysis/Infusion      No service has been selected for the patient.      Home Medical Care      No service has been selected for the patient.      Therapy      No service has been selected for the patient.      Community Resources      No service has been selected for the patient.        Transportation Services  Private: Car    Final Discharge Disposition Code: 01 - home or self-care

## 2019-12-13 ENCOUNTER — READMISSION MANAGEMENT (OUTPATIENT)
Dept: CALL CENTER | Facility: HOSPITAL | Age: 59
End: 2019-12-13

## 2019-12-13 LAB — BACTERIA SPEC RESP CULT: NORMAL

## 2019-12-13 NOTE — OUTREACH NOTE
Prep Survey      Responses   Facility patient discharged from?  [facility]   Is patient eligible?  Yes   Does the patient have one of the following disease processes/diagnoses(primary or secondary)?  COPD/Pneumonia   Does the patient have Home health ordered?  No   Is there a DME ordered?  No   Prep survey completed?  Yes          Valery Hinds RN

## 2019-12-16 ENCOUNTER — READMISSION MANAGEMENT (OUTPATIENT)
Dept: CALL CENTER | Facility: HOSPITAL | Age: 59
End: 2019-12-16

## 2019-12-16 NOTE — OUTREACH NOTE
COPD/PN Week 1 Survey      Responses   Facility patient discharged from?  [facility]   Does the patient have one of the following disease processes/diagnoses(primary or secondary)?  COPD/Pneumonia   Is there a successful TCM telephone encounter documented?  No   Was the primary reason for admission:  COPD exacerbation   Week 1 attempt successful?  Yes   Call start time  1205   Call end time  1211   Meds reviewed with patient/caregiver?  Yes   Is the patient having any side effects they believe may be caused by any medication additions or changes?  No   Does the patient have all medications ordered at discharge?  No   What is keeping the patient from filling the prescriptions?  -- [ins would not cover the nicotine patches]   Nursing Interventions  Nurse provided patient education   Is the patient taking all medications as directed (includes completed medication regime)?  No   What is preventing the patient from taking all medications as directed?  Other   Does the patient have a primary care provider?   Yes   Does the patient have an appointment with their PCP or pulmonologist within 7 days of discharge?  Yes   Has the patient kept scheduled appointments due by today?  N/A   Psychosocial issues?  No   Comments  Pt reports that he is doing better. Denies VIC, SOA or further issues.    Did the patient receive a copy of their discharge instructions?  Yes   Nursing interventions  Reviewed instructions with patient   What is the patient's perception of their health status since discharge?  Improving   Is the patient/caregiver able to teach back the hierarchy of who to call/visit for symptoms/problems? PCP, Specialist, Home health nurse, Urgent Care, ED, 911  Yes   Patient reports what zone on this call?  Green Zone   Green Zone  Reports doing well, Breathing without shortness of breath, Usual activity and exercise level   Week 1 call completed?  Yes          Chayo Vera RN

## 2019-12-23 ENCOUNTER — READMISSION MANAGEMENT (OUTPATIENT)
Dept: CALL CENTER | Facility: HOSPITAL | Age: 59
End: 2019-12-23

## 2019-12-23 NOTE — OUTREACH NOTE
COPD/PN Week 2 Survey      Responses   Facility patient discharged from?  [facility]   Does the patient have one of the following disease processes/diagnoses(primary or secondary)?  COPD/Pneumonia   Was the primary reason for admission:  COPD exacerbation   Week 2 attempt successful?  No   Unsuccessful attempts  Attempt 1          Omid Burgos RN

## 2019-12-26 ENCOUNTER — READMISSION MANAGEMENT (OUTPATIENT)
Dept: CALL CENTER | Facility: HOSPITAL | Age: 59
End: 2019-12-26

## 2019-12-26 NOTE — OUTREACH NOTE
COPD/PN Week 2 Survey      Responses   Facility patient discharged from?  [facility]   Does the patient have one of the following disease processes/diagnoses(primary or secondary)?  COPD/Pneumonia   Was the primary reason for admission:  COPD exacerbation   Week 2 attempt successful?  Yes   Call start time  1340   Call end time  1345   Is patient permission given to speak with other caregiver?  No   Meds reviewed with patient/caregiver?  Yes   Is the patient having any side effects they believe may be caused by any medication additions or changes?  No   Does the patient have all medications ordered at discharge?  Yes   Is the patient taking all medications as directed (includes completed medication regime)?  Yes   Medication comments  Did not get the strips for the blood sugar.    Does the patient have a primary care provider?   Yes   Does the patient have an appointment with their PCP or pulmonologist within 7 days of discharge?  No   Comments regarding PCP  He has not made an appt.    What is preventing the patient from scheduling follow up appointments within 7 days of discharge?  Haven't had time   Has the patient kept scheduled appointments due by today?  N/A   Did the patient receive a copy of their discharge instructions?  Yes   Nursing interventions  Reviewed instructions with patient   What is the patient's perception of their health status since discharge?  Improving   Nursing Interventions  Nurse provided patient education   Are the patient's immunizations up to date?   Yes   Nursing interventions  Educated on importance of maintaining up to date immunizations as advised by provider   If the patient is a current smoker, are they able to teach back resources for cessation?  -- [He still smokes. ]   Is the patient/caregiver able to teach back the hierarchy of who to call/visit for symptoms/problems? PCP, Specialist, Home health nurse, Urgent Care, ED, 911  Yes   Is the patient able to teach back COPD zones?   Yes   Patient reports what zone on this call?  Green Zone   Green Zone  Reports doing well, Breathing without shortness of breath, Usual amount of phlegm/mucus without difficulty coughing up, Sleeping well, Appetite is good   Green Zone interventions:  Take daily medications, Continue regular exercise/diet plan, Avoid indoor/outdoor triggers   Week 2 call completed?  Yes          Chata Hung RN

## 2020-01-22 ENCOUNTER — APPOINTMENT (OUTPATIENT)
Dept: SLEEP MEDICINE | Facility: HOSPITAL | Age: 60
End: 2020-01-22

## 2020-01-24 ENCOUNTER — APPOINTMENT (OUTPATIENT)
Dept: CT IMAGING | Facility: HOSPITAL | Age: 60
End: 2020-01-24

## 2020-01-24 ENCOUNTER — HOSPITAL ENCOUNTER (EMERGENCY)
Facility: HOSPITAL | Age: 60
Discharge: HOME | End: 2020-01-24
Attending: STUDENT IN AN ORGANIZED HEALTH CARE EDUCATION/TRAINING PROGRAM | Admitting: STUDENT IN AN ORGANIZED HEALTH CARE EDUCATION/TRAINING PROGRAM

## 2020-01-24 VITALS
SYSTOLIC BLOOD PRESSURE: 151 MMHG | DIASTOLIC BLOOD PRESSURE: 88 MMHG | HEIGHT: 75 IN | HEART RATE: 73 BPM | BODY MASS INDEX: 34.32 KG/M2 | RESPIRATION RATE: 20 BRPM | TEMPERATURE: 97.8 F | OXYGEN SATURATION: 96 %

## 2020-01-24 DIAGNOSIS — J44.1 COPD EXACERBATION (HCC): Primary | ICD-10-CM

## 2020-01-24 DIAGNOSIS — R06.09 DYSPNEA ON EXERTION: ICD-10-CM

## 2020-01-24 LAB
A-A DO2: 64.6 MMHG
ALBUMIN SERPL-MCNC: 4.3 G/DL (ref 3.5–5.2)
ALBUMIN/GLOB SERPL: 1.2 G/DL
ALP SERPL-CCNC: 88 U/L (ref 39–117)
ALT SERPL W P-5'-P-CCNC: 23 U/L (ref 1–41)
ANION GAP SERPL CALCULATED.3IONS-SCNC: 15.5 MMOL/L (ref 5–15)
ARTERIAL PATENCY WRIST A: POSITIVE
AST SERPL-CCNC: 16 U/L (ref 1–40)
ATMOSPHERIC PRESS: 730 MMHG
BASE EXCESS BLDA CALC-SCNC: 4.4 MMOL/L (ref 0–2)
BASOPHILS # BLD AUTO: 0.06 10*3/MM3 (ref 0–0.2)
BASOPHILS NFR BLD AUTO: 0.6 % (ref 0–1.5)
BDY SITE: ABNORMAL
BILIRUB SERPL-MCNC: 0.7 MG/DL (ref 0.2–1.2)
BUN BLD-MCNC: 9 MG/DL (ref 8–23)
BUN/CREAT SERPL: 11.3 (ref 7–25)
CALCIUM SPEC-SCNC: 9.3 MG/DL (ref 8.6–10.5)
CHLORIDE SERPL-SCNC: 94 MMOL/L (ref 98–107)
CO2 SERPL-SCNC: 26.5 MMOL/L (ref 22–29)
COHGB MFR BLD: 1 % (ref 0–2)
CREAT BLD-MCNC: 0.8 MG/DL (ref 0.76–1.27)
DEPRECATED RDW RBC AUTO: 47.3 FL (ref 37–54)
EOSINOPHIL # BLD AUTO: 0.04 10*3/MM3 (ref 0–0.4)
EOSINOPHIL NFR BLD AUTO: 0.4 % (ref 0.3–6.2)
ERYTHROCYTE [DISTWIDTH] IN BLOOD BY AUTOMATED COUNT: 13.1 % (ref 12.3–15.4)
FLUAV AG NPH QL: NEGATIVE
FLUBV AG NPH QL IA: NEGATIVE
GAS FLOW AIRWAY: 2 LPM
GFR SERPL CREATININE-BSD FRML MDRD: 99 ML/MIN/1.73
GLOBULIN UR ELPH-MCNC: 3.5 GM/DL
GLUCOSE BLD-MCNC: 178 MG/DL (ref 65–99)
HCO3 BLDA-SCNC: 31.3 MMOL/L (ref 22–28)
HCT VFR BLD AUTO: 55 % (ref 37.5–51)
HCT VFR BLD CALC: 56.9 %
HGB BLD-MCNC: 19.3 G/DL (ref 13–17.7)
HGB BLDA-MCNC: 18.6 G/DL (ref 12–18)
HOLD SPECIMEN: NORMAL
HOLD SPECIMEN: NORMAL
HOROWITZ INDEX BLD+IHG-RTO: 28 %
IMM GRANULOCYTES # BLD AUTO: 0.03 10*3/MM3 (ref 0–0.05)
IMM GRANULOCYTES NFR BLD AUTO: 0.3 % (ref 0–0.5)
LYMPHOCYTES # BLD AUTO: 1.41 10*3/MM3 (ref 0.7–3.1)
LYMPHOCYTES NFR BLD AUTO: 14.7 % (ref 19.6–45.3)
MCH RBC QN AUTO: 34.3 PG (ref 26.6–33)
MCHC RBC AUTO-ENTMCNC: 35.1 G/DL (ref 31.5–35.7)
MCV RBC AUTO: 97.7 FL (ref 79–97)
METHGB BLD QL: 0.7 % (ref 0–1.5)
MODALITY: ABNORMAL
MONOCYTES # BLD AUTO: 0.77 10*3/MM3 (ref 0.1–0.9)
MONOCYTES NFR BLD AUTO: 8 % (ref 5–12)
NEUTROPHILS # BLD AUTO: 7.3 10*3/MM3 (ref 1.7–7)
NEUTROPHILS NFR BLD AUTO: 76 % (ref 42.7–76)
NOTE: ABNORMAL
NRBC BLD AUTO-RTO: 0 /100 WBC (ref 0–0.2)
NT-PROBNP SERPL-MCNC: 109 PG/ML (ref 5–900)
OXYHGB MFR BLDV: 91.8 % (ref 94–99)
PCO2 BLDA: 52.3 MM HG (ref 35–45)
PCO2 TEMP ADJ BLD: ABNORMAL MM[HG]
PH BLDA: 7.38 PH UNITS (ref 7.3–7.5)
PH, TEMP CORRECTED: ABNORMAL
PLATELET # BLD AUTO: 261 10*3/MM3 (ref 140–450)
PMV BLD AUTO: 9.4 FL (ref 6–12)
PO2 BLDA: 68.1 MM HG (ref 75–100)
PO2 TEMP ADJ BLD: ABNORMAL MM[HG]
POTASSIUM BLD-SCNC: 3.8 MMOL/L (ref 3.5–5.2)
PROT SERPL-MCNC: 7.8 G/DL (ref 6–8.5)
RBC # BLD AUTO: 5.63 10*6/MM3 (ref 4.14–5.8)
SAO2 % BLDCOA: 93.4 % (ref 94–100)
SODIUM BLD-SCNC: 136 MMOL/L (ref 136–145)
TROPONIN T SERPL-MCNC: <0.01 NG/ML (ref 0–0.03)
VENTILATOR MODE: ABNORMAL
WBC NRBC COR # BLD: 9.61 10*3/MM3 (ref 3.4–10.8)
WHOLE BLOOD HOLD SPECIMEN: NORMAL
WHOLE BLOOD HOLD SPECIMEN: NORMAL

## 2020-01-24 PROCEDURE — 96374 THER/PROPH/DIAG INJ IV PUSH: CPT

## 2020-01-24 PROCEDURE — 71250 CT THORAX DX C-: CPT

## 2020-01-24 PROCEDURE — 82375 ASSAY CARBOXYHB QUANT: CPT

## 2020-01-24 PROCEDURE — 87804 INFLUENZA ASSAY W/OPTIC: CPT | Performed by: PHYSICIAN ASSISTANT

## 2020-01-24 PROCEDURE — 94640 AIRWAY INHALATION TREATMENT: CPT

## 2020-01-24 PROCEDURE — 83880 ASSAY OF NATRIURETIC PEPTIDE: CPT | Performed by: PHYSICIAN ASSISTANT

## 2020-01-24 PROCEDURE — 82805 BLOOD GASES W/O2 SATURATION: CPT

## 2020-01-24 PROCEDURE — 85025 COMPLETE CBC W/AUTO DIFF WBC: CPT | Performed by: PHYSICIAN ASSISTANT

## 2020-01-24 PROCEDURE — 25010000002 METHYLPREDNISOLONE PER 125 MG: Performed by: PHYSICIAN ASSISTANT

## 2020-01-24 PROCEDURE — 84484 ASSAY OF TROPONIN QUANT: CPT | Performed by: PHYSICIAN ASSISTANT

## 2020-01-24 PROCEDURE — 99284 EMERGENCY DEPT VISIT MOD MDM: CPT

## 2020-01-24 PROCEDURE — 94799 UNLISTED PULMONARY SVC/PX: CPT

## 2020-01-24 PROCEDURE — 80053 COMPREHEN METABOLIC PANEL: CPT | Performed by: PHYSICIAN ASSISTANT

## 2020-01-24 PROCEDURE — 83050 HGB METHEMOGLOBIN QUAN: CPT

## 2020-01-24 PROCEDURE — 36600 WITHDRAWAL OF ARTERIAL BLOOD: CPT

## 2020-01-24 PROCEDURE — 93005 ELECTROCARDIOGRAM TRACING: CPT | Performed by: PHYSICIAN ASSISTANT

## 2020-01-24 RX ORDER — IPRATROPIUM BROMIDE AND ALBUTEROL SULFATE 2.5; .5 MG/3ML; MG/3ML
3 SOLUTION RESPIRATORY (INHALATION) ONCE
Status: COMPLETED | OUTPATIENT
Start: 2020-01-24 | End: 2020-01-24

## 2020-01-24 RX ORDER — METHYLPREDNISOLONE SODIUM SUCCINATE 125 MG/2ML
125 INJECTION, POWDER, LYOPHILIZED, FOR SOLUTION INTRAMUSCULAR; INTRAVENOUS ONCE
Status: COMPLETED | OUTPATIENT
Start: 2020-01-24 | End: 2020-01-24

## 2020-01-24 RX ORDER — SODIUM CHLORIDE 0.9 % (FLUSH) 0.9 %
10 SYRINGE (ML) INJECTION AS NEEDED
Status: DISCONTINUED | OUTPATIENT
Start: 2020-01-24 | End: 2020-01-24 | Stop reason: HOSPADM

## 2020-01-24 RX ADMIN — IPRATROPIUM BROMIDE AND ALBUTEROL SULFATE 3 ML: .5; 3 SOLUTION RESPIRATORY (INHALATION) at 13:42

## 2020-01-24 RX ADMIN — METHYLPREDNISOLONE SODIUM SUCCINATE 125 MG: 125 INJECTION, POWDER, FOR SOLUTION INTRAMUSCULAR; INTRAVENOUS at 16:00

## 2020-01-24 NOTE — ED PROVIDER NOTES
Subjective   59 yr old male  presents with shortness of breath, he said the symptoms for 3 days.  He was just recently released from the hospital month ago for copd exacerbation  he has been using home nebs, he is not on oxygen at home, he states he is supposed to be on BiPAP.  He states he has been feeling short of breath over the last several days, and he has been experiencing t hot and cold chills.  The symptoms are worse with exertion, and with a cough or deep breath.      History provided by:  Patient   used: No        Review of Systems   Respiratory: Positive for cough, shortness of breath and wheezing.    All other systems reviewed and are negative.      Past Medical History:   Diagnosis Date   • Arthritis    • COPD (chronic obstructive pulmonary disease) (CMS/[facility])    • Hypertension    • skin cancer    • Sleep apnea        Allergies   Allergen Reactions   • Corticosteroids Irritability   • Nsaids Anxiety       Past Surgical History:   Procedure Laterality Date   • ADENOIDECTOMY         History reviewed. No pertinent family history.    Social History     Socioeconomic History   • Marital status:      Spouse name: Not on file   • Number of children: Not on file   • Years of education: Not on file   • Highest education level: Not on file   Tobacco Use   • Smoking status: Current Every Day Smoker     Packs/day: 2.00     Types: Cigarettes   • Smokeless tobacco: Never Used   Substance and Sexual Activity   • Alcohol use: No     Comment: quit 20 years ago   • Drug use: Yes     Types: Marijuana     Comment: quit suboxone 2 years ago   • Sexual activity: Defer           Objective   Physical Exam   Constitutional: He is oriented to person, place, and time. He appears well-developed and well-nourished.   HENT:   Head: Normocephalic and atraumatic.   Left Ear: External ear normal.   Eyes: Pupils are equal, round, and reactive to light. EOM are normal.   Neck: Normal range of motion.    Cardiovascular: Normal rate and regular rhythm.   Pulmonary/Chest: Effort normal. He has wheezes.   Abdominal: Soft. Bowel sounds are normal.   Musculoskeletal: Normal range of motion.   Neurological: He is alert and oriented to person, place, and time.   Skin: Skin is warm and dry.   Psychiatric: He has a normal mood and affect. His behavior is normal. Judgment and thought content normal.   Nursing note and vitals reviewed.      Procedures           ED Course  ED Course as of Jan 24 1537 Fri Jan 24, 2020   1335 EKG shows a sinus tachycardia with a rate of 104.  No significant ST segments.  Abnormal EKG.  Interpreted by me.    [DT]   1513 Oxygen being weaned down, patient is being walked around the ER to check saturations with ambulating    [CS]   1517 Oxygen saturations between 91 and 93% while ambulating.    [CS]   1529 Discussed the plan with the patient, upon his discharge he was supposed to have a sleep study performed, as well as follow-up with Dr. Morales, he has missed or had those appointments canceled because he states he lost the paperwork.    [CS]      ED Course User Index  [CS] Martínez Rendon Jr., PA-C  [DT] Skyler Cuevas MD                                               MDM  Number of Diagnoses or Management Options  COPD exacerbation (CMS/HCC): new and requires workup  Dyspnea on exertion: new and requires workup     Amount and/or Complexity of Data Reviewed  Clinical lab tests: reviewed  Tests in the radiology section of CPT®: reviewed  Decide to obtain previous medical records or to obtain history from someone other than the patient: yes    Risk of Complications, Morbidity, and/or Mortality  Presenting problems: low  Diagnostic procedures: low  Management options: low    Patient Progress  Patient progress: stable      Final diagnoses:   COPD exacerbation (CMS/HCC)   Dyspnea on exertion            Martínez Rendon Jr., PA-C  01/24/20 1537

## 2020-12-06 ENCOUNTER — HOSPITAL ENCOUNTER (EMERGENCY)
Facility: HOSPITAL | Age: 60
Discharge: HOME | End: 2020-12-06
Attending: EMERGENCY MEDICINE | Admitting: EMERGENCY MEDICINE

## 2020-12-06 VITALS
DIASTOLIC BLOOD PRESSURE: 75 MMHG | HEIGHT: 75 IN | OXYGEN SATURATION: 94 % | SYSTOLIC BLOOD PRESSURE: 113 MMHG | HEART RATE: 87 BPM | BODY MASS INDEX: 34.94 KG/M2 | WEIGHT: 281 LBS | TEMPERATURE: 98 F | RESPIRATION RATE: 20 BRPM

## 2020-12-06 DIAGNOSIS — F41.9 ANXIETY: Primary | ICD-10-CM

## 2020-12-06 PROCEDURE — 93005 ELECTROCARDIOGRAM TRACING: CPT | Performed by: EMERGENCY MEDICINE

## 2020-12-06 PROCEDURE — 99283 EMERGENCY DEPT VISIT LOW MDM: CPT

## 2020-12-06 RX ORDER — HYDROXYZINE PAMOATE 50 MG/1
50 CAPSULE ORAL 4 TIMES DAILY PRN
Qty: 30 CAPSULE | Refills: 0 | Status: SHIPPED | OUTPATIENT
Start: 2020-12-06

## 2020-12-06 RX ORDER — HYDROXYZINE PAMOATE 50 MG/1
50 CAPSULE ORAL ONCE
Status: COMPLETED | OUTPATIENT
Start: 2020-12-06 | End: 2020-12-06

## 2020-12-06 RX ADMIN — HYDROXYZINE PAMOATE 50 MG: 50 CAPSULE ORAL at 03:06

## 2020-12-06 NOTE — DISCHARGE INSTRUCTIONS
I suspect you may have some component of sleep apnea as this can make you awaken anxious.  I would recommend that you rediscuss sleep study with your primary care physician.

## 2020-12-06 NOTE — ED PROVIDER NOTES
Subjective   History of Present Illness    Chief Complaint: Anxiety  History of Present Illness: 60-year-old male states that that he wakes from sleep scared and gets really anxious.  Denies any chest pain palpitations sick contacts or travel.  Onset: Ongoing issue  Duration: Intermittent  Exacerbating / Alleviating factors: Worse when awakening from sleep  Associated symptoms: None      Nurses Notes reviewed and agree, including vitals, allergies, social history and prior medical history.     REVIEW OF SYSTEMS: All systems reviewed and not pertinent unless noted.    Positive for: Chronic anxiety, awakenings anxious from sleep    Negative for: Chest pain palpitations sick contacts travel hemoptysis syncope  Review of Systems    Past Medical History:   Diagnosis Date   • Arthritis    • COPD (chronic obstructive pulmonary disease) (CMS/[facility])    • Hypertension    • skin cancer    • Sleep apnea        Allergies   Allergen Reactions   • Corticosteroids Irritability   • Nsaids Anxiety       Past Surgical History:   Procedure Laterality Date   • ADENOIDECTOMY         History reviewed. No pertinent family history.    Social History     Socioeconomic History   • Marital status:      Spouse name: Not on file   • Number of children: Not on file   • Years of education: Not on file   • Highest education level: Not on file   Tobacco Use   • Smoking status: Current Every Day Smoker     Packs/day: 2.00     Types: Cigarettes   • Smokeless tobacco: Never Used   Substance and Sexual Activity   • Alcohol use: No     Comment: quit 20 years ago   • Drug use: Yes     Types: Marijuana     Comment: quit suboxone 2 years ago   • Sexual activity: Defer           Objective   Physical Exam    GENERAL APPEARANCE: Well developed, obese 60-year-old white male,  in no acute distress.  VITAL SIGNS: per nursing, reviewed and noted  SKIN: exposed skin with no rashes, ulcerations or petechiae.  Head: Normocephalic, atraumatic.   EYES: perrla.  EOMI.  ENT: Normal voice.  Patient maintained wearing a mask throughout patient encounter due to coronavirus pandemic  LUNGS:  No increased work of breathing. No retractions.   CARDIOVASCULAR:  regular rate and rhythm, no murmurs.  Good Peripheral pulses. Good cap refill to extremities.   ABDOMEN: Soft, nontender, normal bowel sounds. No hernia. No ascites.  MUSCULOSKELETAL:  No tenderness. Full ROM. Strength and tone normal.  NEUROLOGIC: Alert, oriented x 3. No gross deficits. GCS 15.   NECK: Supple, symmetric. No tenderness, no masses. Full ROM  Back: full rom, no paraspinal spasm. No CVA tenderness.   PSYCH: appropriate affect.  : no bladder tenderness or distention, no CVA tenderness      Procedures     No attending physician procedures were performed on this patient.      ED Course                                           MDM  EKG interpreted by me reveals sinus rhythm rate 60.  No ectopy no ischemic changes normal KG.    Suspect some component of sleep apnea given the patient's nocturnal presentation of his anxiety.  He is did state that he was recommended 2 years ago to get sleep apnea studies but he states he never followed up.  Advised to follow-up with his primary care physician.  Provided with hydroxyzine here.  Outpatient follow-up return precautions were discussed.    Final diagnoses:   Anxiety            Fam Carter, DO  12/06/20 0545

## 2021-01-21 ENCOUNTER — DOCUMENTATION (OUTPATIENT)
Dept: EMERGENCY DEPT | Facility: HOSPITAL | Age: 61
End: 2021-01-21

## 2021-01-21 ENCOUNTER — HOSPITAL ENCOUNTER (EMERGENCY)
Facility: HOSPITAL | Age: 61
Discharge: HOME | End: 2021-01-21
Attending: EMERGENCY MEDICINE | Admitting: EMERGENCY MEDICINE

## 2021-01-21 VITALS
RESPIRATION RATE: 15 BRPM | OXYGEN SATURATION: 94 % | BODY MASS INDEX: 34.19 KG/M2 | SYSTOLIC BLOOD PRESSURE: 130 MMHG | HEART RATE: 80 BPM | DIASTOLIC BLOOD PRESSURE: 86 MMHG | TEMPERATURE: 98.2 F | HEIGHT: 75 IN | WEIGHT: 275 LBS

## 2021-01-21 DIAGNOSIS — F41.9 ANXIETY: Primary | ICD-10-CM

## 2021-01-21 DIAGNOSIS — F33.9 EPISODE OF RECURRENT MAJOR DEPRESSIVE DISORDER, UNSPECIFIED DEPRESSION EPISODE SEVERITY (HCC): ICD-10-CM

## 2021-01-21 LAB
ALBUMIN SERPL-MCNC: 4.3 G/DL (ref 3.5–5.2)
ALBUMIN/GLOB SERPL: 1.3 G/DL
ALP SERPL-CCNC: 98 U/L (ref 39–117)
ALT SERPL W P-5'-P-CCNC: 23 U/L (ref 1–41)
ANION GAP SERPL CALCULATED.3IONS-SCNC: 14.7 MMOL/L (ref 5–15)
APAP SERPL-MCNC: <5 MCG/ML (ref 0–30)
AST SERPL-CCNC: 25 U/L (ref 1–40)
BASOPHILS # BLD AUTO: 0.08 10*3/MM3 (ref 0–0.2)
BASOPHILS NFR BLD AUTO: 0.7 % (ref 0–1.5)
BILIRUB SERPL-MCNC: 1.1 MG/DL (ref 0–1.2)
BUN SERPL-MCNC: 15 MG/DL (ref 8–23)
BUN/CREAT SERPL: 20.5 (ref 7–25)
CALCIUM SPEC-SCNC: 9.5 MG/DL (ref 8.6–10.5)
CHLORIDE SERPL-SCNC: 91 MMOL/L (ref 98–107)
CO2 SERPL-SCNC: 28.3 MMOL/L (ref 22–29)
CREAT SERPL-MCNC: 0.73 MG/DL (ref 0.76–1.27)
DEPRECATED RDW RBC AUTO: 40.1 FL (ref 37–54)
EOSINOPHIL # BLD AUTO: 0.04 10*3/MM3 (ref 0–0.4)
EOSINOPHIL NFR BLD AUTO: 0.3 % (ref 0.3–6.2)
ERYTHROCYTE [DISTWIDTH] IN BLOOD BY AUTOMATED COUNT: 11.9 % (ref 12.3–15.4)
ETHANOL BLD-MCNC: <10 MG/DL (ref 0–10)
ETHANOL UR QL: <0.01 %
GFR SERPL CREATININE-BSD FRML MDRD: 109 ML/MIN/1.73
GLOBULIN UR ELPH-MCNC: 3.3 GM/DL
GLUCOSE SERPL-MCNC: 195 MG/DL (ref 65–99)
HCT VFR BLD AUTO: 53.2 % (ref 37.5–51)
HGB BLD-MCNC: 19.3 G/DL (ref 13–17.7)
HOLD SPECIMEN: NORMAL
HOLD SPECIMEN: NORMAL
IMM GRANULOCYTES # BLD AUTO: 0.04 10*3/MM3 (ref 0–0.05)
IMM GRANULOCYTES NFR BLD AUTO: 0.3 % (ref 0–0.5)
LYMPHOCYTES # BLD AUTO: 1.84 10*3/MM3 (ref 0.7–3.1)
LYMPHOCYTES NFR BLD AUTO: 16 % (ref 19.6–45.3)
MCH RBC QN AUTO: 33.3 PG (ref 26.6–33)
MCHC RBC AUTO-ENTMCNC: 36.3 G/DL (ref 31.5–35.7)
MCV RBC AUTO: 91.7 FL (ref 79–97)
MONOCYTES # BLD AUTO: 1.01 10*3/MM3 (ref 0.1–0.9)
MONOCYTES NFR BLD AUTO: 8.8 % (ref 5–12)
NEUTROPHILS NFR BLD AUTO: 73.9 % (ref 42.7–76)
NEUTROPHILS NFR BLD AUTO: 8.48 10*3/MM3 (ref 1.7–7)
NRBC BLD AUTO-RTO: 0 /100 WBC (ref 0–0.2)
PLATELET # BLD AUTO: 225 10*3/MM3 (ref 140–450)
PMV BLD AUTO: 10.7 FL (ref 6–12)
POTASSIUM SERPL-SCNC: 3.2 MMOL/L (ref 3.5–5.2)
PROT SERPL-MCNC: 7.6 G/DL (ref 6–8.5)
RBC # BLD AUTO: 5.8 10*6/MM3 (ref 4.14–5.8)
SALICYLATES SERPL-MCNC: <0.3 MG/DL
SODIUM SERPL-SCNC: 134 MMOL/L (ref 136–145)
WBC # BLD AUTO: 11.49 10*3/MM3 (ref 3.4–10.8)
WHOLE BLOOD HOLD SPECIMEN: NORMAL
WHOLE BLOOD HOLD SPECIMEN: NORMAL

## 2021-01-21 PROCEDURE — 93005 ELECTROCARDIOGRAM TRACING: CPT | Performed by: NURSE PRACTITIONER

## 2021-01-21 PROCEDURE — 80053 COMPREHEN METABOLIC PANEL: CPT | Performed by: NURSE PRACTITIONER

## 2021-01-21 PROCEDURE — 99284 EMERGENCY DEPT VISIT MOD MDM: CPT

## 2021-01-21 PROCEDURE — 82077 ASSAY SPEC XCP UR&BREATH IA: CPT | Performed by: NURSE PRACTITIONER

## 2021-01-21 PROCEDURE — 80179 DRUG ASSAY SALICYLATE: CPT | Performed by: NURSE PRACTITIONER

## 2021-01-21 PROCEDURE — 85025 COMPLETE CBC W/AUTO DIFF WBC: CPT | Performed by: NURSE PRACTITIONER

## 2021-01-21 PROCEDURE — 80143 DRUG ASSAY ACETAMINOPHEN: CPT | Performed by: NURSE PRACTITIONER

## 2021-01-21 RX ORDER — HYDROXYZINE PAMOATE 50 MG/1
50 CAPSULE ORAL 3 TIMES DAILY PRN
Qty: 30 CAPSULE | Refills: 0 | Status: SHIPPED | OUTPATIENT
Start: 2021-01-21

## 2021-01-21 RX ORDER — POTASSIUM CHLORIDE 750 MG/1
40 CAPSULE, EXTENDED RELEASE ORAL ONCE
Status: COMPLETED | OUTPATIENT
Start: 2021-01-21 | End: 2021-01-21

## 2021-01-21 RX ORDER — SODIUM CHLORIDE 0.9 % (FLUSH) 0.9 %
10 SYRINGE (ML) INJECTION AS NEEDED
Status: DISCONTINUED | OUTPATIENT
Start: 2021-01-21 | End: 2021-01-21 | Stop reason: HOSPADM

## 2021-01-21 RX ORDER — HYDROXYZINE PAMOATE 50 MG/1
50 CAPSULE ORAL ONCE
Status: COMPLETED | OUTPATIENT
Start: 2021-01-21 | End: 2021-01-21

## 2021-01-21 RX ADMIN — POTASSIUM CHLORIDE 40 MEQ: 10 CAPSULE, COATED, EXTENDED RELEASE ORAL at 19:58

## 2021-01-21 RX ADMIN — HYDROXYZINE PAMOATE 50 MG: 50 CAPSULE ORAL at 17:14

## 2021-01-21 NOTE — ED PROVIDER NOTES
Subjective   History of Present Illness  This is a 60 year old male who comes in today complaining of increase in anxiety and sadness. He reports he has had increase in depression since his his wife  two years ago he has been feeling more and more alone. His anxiety gets worse when he is alone. He is so depressed he has thought about killing himself.   Review of Systems   Constitutional: Negative.    HENT: Negative.    Eyes: Negative.    Respiratory: Negative.    Cardiovascular: Negative.    Gastrointestinal: Negative.    Endocrine: Negative.    Genitourinary: Negative.    Skin: Negative.    Allergic/Immunologic: Negative.    Neurological: Negative.    Psychiatric/Behavioral: Positive for suicidal ideas. The patient is nervous/anxious.        Past Medical History:   Diagnosis Date   • Arthritis    • COPD (chronic obstructive pulmonary disease) (CMS/[facility])    • Hypertension    • skin cancer    • Sleep apnea        Allergies   Allergen Reactions   • Corticosteroids Irritability   • Nsaids Anxiety       Past Surgical History:   Procedure Laterality Date   • ADENOIDECTOMY         History reviewed. No pertinent family history.    Social History     Socioeconomic History   • Marital status:      Spouse name: Not on file   • Number of children: Not on file   • Years of education: Not on file   • Highest education level: Not on file   Tobacco Use   • Smoking status: Current Every Day Smoker     Packs/day: 2.00     Types: Cigarettes   • Smokeless tobacco: Never Used   Substance and Sexual Activity   • Alcohol use: No     Comment: quit 20 years ago   • Drug use: Yes     Types: Marijuana     Comment: quit suboxone 2 years ago   • Sexual activity: Defer           Objective   Physical Exam  Vitals signs and nursing note reviewed.   Constitutional:       Appearance: Normal appearance. He is normal weight.   Neurological:      Mental Status: He is alert.     GEN: No acute distress  Head: Normocephalic, atraumatic  Eyes:  Pupils equal round reactive to light  ENT: Posterior pharynx normal in appearance, oral mucosa is moist  Chest: Nontender to palpation  Cardiovascular: Regular rate  Lungs: Clear to auscultation bilaterally  Abdomen: Soft, nontender, nondistended, no peritoneal signs  Extremities: No edema, normal appearance  Neuro: GCS 15  Psych: Mood and affect are appropriate      Procedures           ED Course  ED Course as of Jan 21 1931   Thu Jan 21, 2021   1806 EKG interpreted by me reveals sinus rhythm with a rate of 63 bpm.  There is occasional premature supraventricular contraction.  This is an abnormal appearing EKG.    [TB]   1927 Patient does not wish to go to in patient and is seeing a therapist at the suboxone clinic. We will give him some other resources and a prescriptions of visteril to help with his break through anxiety and have him follow up with his PCP for med management of his depression.     [TW]      ED Course User Index  [TB] Geneva Lewis MD  [TW] Linda Richey, APRN                                           MDM  Number of Diagnoses or Management Options     Amount and/or Complexity of Data Reviewed  Clinical lab tests: ordered and reviewed  Tests in the radiology section of CPT®: ordered and reviewed  Review and summarize past medical records: yes  Discuss the patient with other providers: yes  Independent visualization of images, tracings, or specimens: yes    Risk of Complications, Morbidity, and/or Mortality  Presenting problems: moderate  Diagnostic procedures: moderate  Management options: moderate        Final diagnoses:   Anxiety   Episode of recurrent major depressive disorder, unspecified depression episode severity (CMS/HCC)            Linda Richey APRN  01/21/21 1931

## 2021-01-22 NOTE — ED NOTES
"Stephane Jensen  1960    TIME: 1855-1925    Is patient agreeable to admission/treatment? N/A    Guardian: self    Pt Lives With:  Alone    Presenting Problems: (How is the patient a danger to self or others?) Patient presents to ED via EMS for worsening anxiety. Patient states, \"I don't feel right. I've been having anxiety attacks real bad.\" Patient reports he started having anxiety 2-3 years ago but it has worsened over the last couple months. Patient endorses vague SI but denies plan and intent. Patient is looking for \"something to calm me down.\"     Current Stressors: Patient states, \"I don't know. I'm living by myself and I've never lived alone before.\"     Depression: 10     Anxiety: 10    Previous Psychiatric Treatment: Yes, patient is engaged in MAT at [facility]. Patient reports he participates in therapy 2x per month (with [facility]) and recieves medication management from PCP.    Last inpatient admission: Patient denies history of psychiatric hospitalizations.     Number of admissions: N/A    Suicidal: Patient endorses vague SI but denies plan and intent. Patient states, \"I wouldn't do it if it came down to it.\" Patient denies history of suicide attempts.     Previous Attempts: no prior suicide attempts    Number of Previous Attempts: N/A      [place]-SUICIDE SEVERITY RATING SCALE  Psychiatric Inpatient Setting - Discharge Screener    Ask questions that are bold and underlined Discharge   Ask Questions 1 and 2 YES NO   1) Wish to be Dead:   Person endorses thoughts about a wish to be dead or not alive anymore, or wish to fall asleep and not wake up.  While you were here in the hospital, have you wished you were dead or wished you could go to sleep and not wake up?  X   2) Suicidal Thoughts:   General non-specific thoughts of wanting to end one's life/die by suicide, “I've thought about killing myself” without general thoughts of ways to kill " oneself/associated methods, intent, or plan.   While you were here in the hospital, have you actually had thoughts about killing yourself?  X    If YES to 2, ask questions 3, 4, 5, and 6.  If NO to 2, go directly to question 6   3) Suicidal Thoughts with Method (without Specific Plan or Intent to Act):   Person endorses thoughts of suicide and has thought of a least one method during the assessment period. This is different than a specific plan with time, place or method details worked out. “I thought about taking an overdose but I never made a specific plan as to when where or how I would actually do it….and I would never go through with it.”   Have you been thinking about how you might kill yourself?   X   4) Suicidal Intent (without Specific Plan):   Active suicidal thoughts of killing oneself and patient reports having some intent to act on such thoughts, as opposed to “I have the thoughts but I definitely will not do anything about them.”   Have you had these thoughts and had some intention of acting on them or do you have some intention of acting on them after you leave the hospital?   X   5) Suicide Intent with Specific Plan:   Thoughts of killing oneself with details of plan fully or partially worked out and person has some intent to carry it out.   Have you started to work out or worked out the details of how to kill yourself either for while you were here in the hospital or for after you leave the hospital? Do you intend to carry out this plan?   X     6) Suicide Behavior    While you were here in the hospital, have you done anything, started to do anything, or prepared to do anything to end your life?    Examples: Took pills, cut yourself, tried to hang yourself, took out pills but didn't swallow any because you changed your mind or someone took them from you, collected pills, secured a means of obtaining a gun, gave away valuables, wrote a will or suicide note, etc.  X       Delusions: Patient presents  "with linear thought process.     Hallucinations: Patient denies history of hallucinations; Not demonstrated today    Mood: anxious and irritable     Homicidal Ideations: Absent     Abuse History: Patient does not disclose.     Does this require reporting: N/A    Legal History / History of Violence: The patient has no current pending legal charges.     Sleep: Poor, patient reports difficulty falling asleep     Appetite: Poor    Current Medical Conditions: Yes    If yes, explain: hypertension, COPD    Current Psychiatric Medications:   Hydroxyzine 50mg  Sertraline 100mg   Suboxone 8 mg      History of Inappropriate Sexual Behavior: No    Hopelessness: Mild    Orientation: alert and oriented to person, place, and time     Substance Abuse: Patient reports smoking marijuana daily. He denies other illicit drug use.     COWS: N/A    CIWA: N/A    Withdrawal Symptoms: N/A     History of DT's: No    History of Seizures: No    SUBSTANCE ABUSE HISTORY:      DRUG   PRESENT USE  Y/N   AGE @ 1ST USE    ROUTE   HOW MUCH   HOW OFTEN   HOW LONG AT THIS RATE   Date of last use/  Amount used   Nicotine            Alcohol            Marijuana   yes    daily  \"a couple days ago\"   Benzos              Neurontin            Methadone            Opiates              Cocaine             Heroin            Meth            Suboxone              DATA:   This therapist received a call from [facility] staff PUJA Coronado with orders from BAUDILIO Mckeon for a behavioral health consult.  The patient serves as his own guardian and is agreeable to speak with me.  Met with patient at bedside. Patient is not under 1:1 security monitoring during assessment.  Patient is a 60 year old, , , male residing in [place], [place]. Patient currently lives alone. Patient presents to ED via EMS for worsening anxiety. Patient states, \"I don't feel right. I've been having anxiety attacks real bad.\" Patient reports he started having anxiety 2-3 years ago but it has " "worsened over the last couple months. He reports increased heart rate, sweating, poor concentration, poor sleep and poor appetite. Patient has difficulty identifying specific stressors but reports he lives by himself and has never lived alone before. Patient reports he is engaged in [facility] for therapy and MAT. He also receives medication management from PCP. Patient states he is compliant with medication. Patient endorses vague SI but denies plan and intent. Patient states, \"I wouldn't do it if it came down to it.\" Patient denies history of inpatient hospitalizations and suicide attempts. Patient is interested in \"something to calm me down.\"     ASSESSMENT:    Therapist completed CSSRS with patient for suicide risk assessment.  The results of patient’s CSSRS suggest that patient endorses vague SI but denies death wish, plan and intent.  Patient holds attention and is Guarded with assessment.  Patient’s appearance is disheveled.  The patient displays Restless psychomotor behavior. The patient's affect appears tearful. The patient is observed to have normal rate, tone and rhythm of speech.   Patient observed to have Poor eye contact. The patient's displays poor insight, with fair impulse control and fair judgement.     PLAN:    At this time, therapist recommends patient follow up with established outpatient care; patient agreeable to this. Patient does not present with criteria to warrant an involuntary petition or psychiatric hold at this time as he is not actively suicidal, homicidal, or displaying symptoms of an acute psychotic episode. I also discussed the availability of emergency behavioral health services 24/7 at [facility].  Assisted patient in identifying risk factors that would indicate the need for higher level of care, such as thoughts to harm self or others and/or self-harming behavior(s). Encouraged patient to call 911, crisis hotlines, or present to the nearest " emergency department should symptoms worsen, or in any crisis/emergency. Patient agreeable and voiced understanding. Therapist updated [facility] ED staff RN Linda Lauren who are agreeable to plan.     AMBER Bejarano 1/21/2021               Lara Forrester  01/21/21 2054

## 2022-11-08 ENCOUNTER — OFFICE VISIT (OUTPATIENT)
Dept: INTERNAL MEDICINE | Facility: CLINIC | Age: 62
End: 2022-11-08

## 2022-11-08 VITALS
TEMPERATURE: 98.2 F | BODY MASS INDEX: 30.03 KG/M2 | WEIGHT: 234 LBS | DIASTOLIC BLOOD PRESSURE: 76 MMHG | HEART RATE: 83 BPM | OXYGEN SATURATION: 94 % | HEIGHT: 74 IN | SYSTOLIC BLOOD PRESSURE: 124 MMHG

## 2022-11-08 DIAGNOSIS — Z76.89 ENCOUNTER TO ESTABLISH CARE: Primary | ICD-10-CM

## 2022-11-08 DIAGNOSIS — Z76.0 ENCOUNTER FOR MEDICATION REFILL: ICD-10-CM

## 2022-11-08 DIAGNOSIS — I10 ESSENTIAL HYPERTENSION: ICD-10-CM

## 2022-11-08 DIAGNOSIS — Z79.899 MEDICATION MANAGEMENT: ICD-10-CM

## 2022-11-08 DIAGNOSIS — J44.9 CHRONIC OBSTRUCTIVE PULMONARY DISEASE, UNSPECIFIED COPD TYPE: ICD-10-CM

## 2022-11-08 DIAGNOSIS — R79.89 LOW TESTOSTERONE: ICD-10-CM

## 2022-11-08 DIAGNOSIS — E11.43 TYPE 2 DIABETES MELLITUS WITH DIABETIC AUTONOMIC NEUROPATHY, WITHOUT LONG-TERM CURRENT USE OF INSULIN: ICD-10-CM

## 2022-11-08 PROCEDURE — 99214 OFFICE O/P EST MOD 30 MIN: CPT | Performed by: NURSE PRACTITIONER

## 2022-11-08 RX ORDER — ALLOPURINOL 100 MG/1
TABLET ORAL
COMMUNITY
Start: 2022-10-17

## 2022-11-08 RX ORDER — LISINOPRIL 20 MG/1
20 TABLET ORAL
Qty: 90 TABLET | Refills: 2 | Status: SHIPPED | OUTPATIENT
Start: 2022-11-08

## 2022-11-08 RX ORDER — PANTOPRAZOLE SODIUM 40 MG/1
TABLET, DELAYED RELEASE ORAL
COMMUNITY
Start: 2022-10-17

## 2022-11-08 RX ORDER — ATORVASTATIN CALCIUM 20 MG/1
TABLET, FILM COATED ORAL
COMMUNITY
Start: 2022-09-29

## 2022-11-08 RX ORDER — GABAPENTIN 800 MG/1
800 TABLET ORAL 3 TIMES DAILY
Qty: 90 TABLET | Refills: 2 | Status: SHIPPED | OUTPATIENT
Start: 2022-11-08

## 2022-11-08 RX ORDER — CITALOPRAM 20 MG/1
TABLET ORAL
COMMUNITY
Start: 2022-10-17

## 2022-11-08 NOTE — PROGRESS NOTES
"Chief Complaint   Patient presents with   • Establish Care     Knee issues, needs med refills, requesting testosterone shots for home     Subjective   Stephane Jensen is a 62 y.o. male.     History of Present Illness     Patient presents to establish care.  Pending records from [place] [facility].  Has hypertension, type 2 diabetes, secondary polycythemia, COPD, sleep apnea, arthritis, history of skin cancer.  Patient is needing a refill of his gabapentin, lisinopril, Trelegy inhaler, albuterol inhaler.  He is wanting to discuss testosterone injections.  He has bilateral knee pain, history of getting injections at [facility] in [place].  He has been there less than 1 year ago and should still be an established patient.  Number provided for him to contact them to discuss injections.  Patient admits to smoking THC.  He takes Suboxone for the past 7 years through [facility].   Patient states he had annual physical 4 months ago.    The following portions of the patient's history were reviewed and updated as appropriate: allergies, current medications, past family history, past medical history, past social history, past surgical history and problem list.    Review of Systems   Constitutional: Negative.    Eyes: Negative for visual disturbance.   Respiratory: Positive for cough and shortness of breath.    Cardiovascular: Negative.    Musculoskeletal: Positive for arthralgias and gait problem.   Neurological: Negative for dizziness, light-headedness and headaches.   All other systems reviewed and are negative.      Objective   /76   Pulse 83   Temp 98.2 °F (36.8 °C) (Temporal)   Ht 188 cm (74\")   Wt 106 kg (234 lb)   SpO2 94%   BMI 30.04 kg/m²   Body mass index is 30.04 kg/m².  Physical Exam  Constitutional:       Appearance: Normal appearance. He is obese.   HENT:      Head: Normocephalic and atraumatic.      Right Ear: External ear normal.      Left Ear: External ear " normal.      Nose: Nose normal.      Mouth/Throat:      Mouth: Mucous membranes are moist.      Pharynx: Oropharynx is clear.   Eyes:      Extraocular Movements: Extraocular movements intact.      Pupils: Pupils are equal, round, and reactive to light.   Cardiovascular:      Rate and Rhythm: Normal rate and regular rhythm.   Pulmonary:      Effort: Pulmonary effort is normal.      Breath sounds: Normal breath sounds.   Musculoskeletal:         General: Normal range of motion.      Cervical back: Normal range of motion.      Right lower leg: No edema.      Left lower leg: No edema.   Neurological:      Mental Status: He is alert and oriented to person, place, and time.   Psychiatric:         Mood and Affect: Mood normal.           Assessment & Plan   Stephane Jensen is here today and the following problems have been addressed:      Diagnoses and all orders for this visit:    1. Encounter to establish care (Primary)  -     Compliance Drug Analysis, Ur - Urine, Clean Catch  -     CBC No Differential  -     Comprehensive Metabolic Panel  -     Testosterone  -     Hemoglobin A1c    2. Encounter for medication refill  -     Compliance Drug Analysis, Ur - Urine, Clean Catch  -     CBC No Differential  -     Comprehensive Metabolic Panel  -     Testosterone  -     Hemoglobin A1c    3. Medication management  -     Compliance Drug Analysis, Ur - Urine, Clean Catch  -     CBC No Differential  -     Comprehensive Metabolic Panel  -     Testosterone  -     Hemoglobin A1c    4. Type 2 diabetes mellitus with diabetic autonomic neuropathy, without long-term current use of insulin (HCC)  -     gabapentin (NEURONTIN) 800 MG tablet; Take 1 tablet by mouth 3 (Three) Times a Day.  Dispense: 90 tablet; Refill: 2  -     Hemoglobin A1c    5. Essential hypertension  -     lisinopril (PRINIVIL,ZESTRIL) 20 MG tablet; Take 1 tablet by mouth Daily.  Dispense: 90 tablet; Refill: 2  -     CBC No Differential  -     Comprehensive Metabolic  Panel    6. Chronic obstructive pulmonary disease, unspecified COPD type ([facility])  -     ProAir  (90 Base) MCG/ACT inhaler; Inhale 2 puffs Every 4 (Four) Hours As Needed for Wheezing or Shortness of Air.  Dispense: 18 g; Refill: 2  -     Fluticasone-Umeclidin-Vilant 200-62.5-25 MCG/ACT aerosol powder ; Inhale 1 puff Daily.  Dispense: 60 each; Refill: 2    7. Low testosterone  -     Testosterone    Medications refilled for 3 months.  Gaurav reviewed. Controlled substance agreement signed.  UDS collected.  Patient is to return every 3 months for gabapentin.  Pending records from previous PCP.  Hypertension stable, continue current medications as prescribed. Recommend DASH diet, moderate-intensity exercises 4-5 times/week, medication compliance, and home blood pressure monitoring.   Pending A1c for diabetes.  We will make changes as needed based on results to medications.  Continue current medications.  Check feet daily.  Diabetic diet and exercise discussed.  COPD currently controlled per patient on medications.  Take medications as prescribed.  Patient is established with pulmonology.  Testosterone checked with labs today.  If low, will send a referral to urology to consult for treatment.      Follow Up   Return in about 3 months (around 2/8/2023) for Med Management.  Patient was given instructions and counseling regarding his condition or for health maintenance advice. Please see specific information pulled into the AVS if appropriate.     Ilana ASTUDILLO  [facility] [place]

## 2022-11-09 LAB
ALBUMIN SERPL-MCNC: 4 G/DL (ref 3.5–5.2)
ALBUMIN/GLOB SERPL: 1.9 G/DL
ALP SERPL-CCNC: 125 U/L (ref 39–117)
ALT SERPL-CCNC: 9 U/L (ref 1–41)
AST SERPL-CCNC: 16 U/L (ref 1–40)
BILIRUB SERPL-MCNC: 0.6 MG/DL (ref 0–1.2)
BUN SERPL-MCNC: 7 MG/DL (ref 8–23)
BUN/CREAT SERPL: 9.9 (ref 7–25)
CALCIUM SERPL-MCNC: 9.3 MG/DL (ref 8.6–10.5)
CHLORIDE SERPL-SCNC: 96 MMOL/L (ref 98–107)
CO2 SERPL-SCNC: 30.1 MMOL/L (ref 22–29)
CREAT SERPL-MCNC: 0.71 MG/DL (ref 0.76–1.27)
EGFRCR SERPLBLD CKD-EPI 2021: 103.1 ML/MIN/1.73
ERYTHROCYTE [DISTWIDTH] IN BLOOD BY AUTOMATED COUNT: 15 % (ref 12.3–15.4)
GLOBULIN SER CALC-MCNC: 2.1 GM/DL
GLUCOSE SERPL-MCNC: 119 MG/DL (ref 65–99)
HBA1C MFR BLD: 6 % (ref 4.8–5.6)
HCT VFR BLD AUTO: 43.8 % (ref 37.5–51)
HGB BLD-MCNC: 14.9 G/DL (ref 13–17.7)
MCH RBC QN AUTO: 32.9 PG (ref 26.6–33)
MCHC RBC AUTO-ENTMCNC: 34 G/DL (ref 31.5–35.7)
MCV RBC AUTO: 96.7 FL (ref 79–97)
PLATELET # BLD AUTO: 333 10*3/MM3 (ref 140–450)
POTASSIUM SERPL-SCNC: 4 MMOL/L (ref 3.5–5.2)
PROT SERPL-MCNC: 6.1 G/DL (ref 6–8.5)
RBC # BLD AUTO: 4.53 10*6/MM3 (ref 4.14–5.8)
SODIUM SERPL-SCNC: 137 MMOL/L (ref 136–145)
TESTOST SERPL-MCNC: 404 NG/DL (ref 264–916)
WBC # BLD AUTO: 10.97 10*3/MM3 (ref 3.4–10.8)

## 2022-11-10 NOTE — PROGRESS NOTES
No major concerns with labs, A1C stable, testosterone is normal range at this time so no need for injections

## 2022-11-12 LAB — DRUGS UR: NORMAL

## 2022-11-14 NOTE — PROGRESS NOTES
UDS is positive for methamphetamine/amphetamines as well as THC - cannot continue to prescribe gabapentin if positive on next screen.

## 2023-01-10 DIAGNOSIS — E11.43 TYPE 2 DIABETES MELLITUS WITH DIABETIC AUTONOMIC NEUROPATHY, WITHOUT LONG-TERM CURRENT USE OF INSULIN: ICD-10-CM

## 2023-01-10 NOTE — TELEPHONE ENCOUNTER
Rx Refill Note  Requested Prescriptions     Pending Prescriptions Disp Refills   • gabapentin (NEURONTIN) 800 MG tablet [Pharmacy Med Name: GABAPENTIN 800 MG TABS 800 Tablet] 90 tablet 2     Sig: TAKE 1 TABLET BY MOUTH 3 (THREE) TIMES A DAY.      Last office visit with prescribing clinician: 11/8/2022   Last telemedicine visit with prescribing clinician: 2/9/2023   Next office visit with prescribing clinician: 2/9/2023   {    Marilyn Whittaker MA  01/10/23, 16:48 EST     UDS: 11/08/22  CSA: 11/08/22

## 2023-01-11 RX ORDER — GABAPENTIN 800 MG/1
800 TABLET ORAL 3 TIMES DAILY
Qty: 90 TABLET | Refills: 2 | OUTPATIENT
Start: 2023-01-11

## 2023-01-11 NOTE — TELEPHONE ENCOUNTER
Unfortunately last drug screen was not appropriate. Showed positive for THC and methamphetamines. Will need to have clean drug screen before controlled medications can be prescribed.

## 2023-01-18 DIAGNOSIS — J44.9 CHRONIC OBSTRUCTIVE PULMONARY DISEASE, UNSPECIFIED COPD TYPE: ICD-10-CM

## 2023-01-20 RX ORDER — FLUTICASONE FUROATE, UMECLIDINIUM BROMIDE AND VILANTEROL TRIFENATATE 200; 62.5; 25 UG/1; UG/1; UG/1
POWDER RESPIRATORY (INHALATION)
Qty: 60 EACH | Refills: 2 | OUTPATIENT
Start: 2023-01-20

## 2023-01-20 NOTE — TELEPHONE ENCOUNTER
Rx Refill Note  Requested Prescriptions     Pending Prescriptions Disp Refills   • Trelegy Ellipta 200-62.5-25 MCG/ACT aerosol powder  [Pharmacy Med Name: TRELEGY ELLIPTA 200-62.5-25 200-62.5-25 Aerosol] 60 each 2     Sig: INHALE 1 PUFF DAILY.      Last office visit with prescribing clinician: 11/8/2022   Last telemedicine visit with prescribing clinician: Visit date not found   Next office visit with prescribing clinician: Visit date not found                         Would you like a call back once the refill request has been completed: [] Yes [] No    If the office needs to give you a call back, can they leave a voicemail: [] Yes [] No    Rosmery Gamez MA  01/20/23, 09:18 EST

## 2023-01-20 NOTE — TELEPHONE ENCOUNTER
Pt told nurse that he found another provider and self discharged, needs to request medication from new provider.

## 2023-03-21 RX ORDER — CITALOPRAM 20 MG/1
TABLET ORAL
Qty: 90 TABLET | Refills: 1 | OUTPATIENT
Start: 2023-03-21

## 2025-07-27 ENCOUNTER — HOSPITAL ENCOUNTER (EMERGENCY)
Facility: HOSPITAL | Age: 65
Discharge: ANOTHER HOSPITAL | End: 2025-07-28
Attending: STUDENT IN AN ORGANIZED HEALTH CARE EDUCATION/TRAINING PROGRAM | Admitting: STUDENT IN AN ORGANIZED HEALTH CARE EDUCATION/TRAINING PROGRAM
Payer: MEDICARE

## 2025-07-27 ENCOUNTER — APPOINTMENT (OUTPATIENT)
Dept: CT IMAGING | Facility: HOSPITAL | Age: 65
End: 2025-07-27
Payer: MEDICARE

## 2025-07-27 ENCOUNTER — APPOINTMENT (OUTPATIENT)
Dept: GENERAL RADIOLOGY | Facility: HOSPITAL | Age: 65
End: 2025-07-27
Payer: MEDICARE

## 2025-07-27 DIAGNOSIS — R65.20 SEPSIS WITH ACUTE LIVER FAILURE WITHOUT HEPATIC COMA OR SEPTIC SHOCK, DUE TO UNSPECIFIED ORGANISM: ICD-10-CM

## 2025-07-27 DIAGNOSIS — E87.6 HYPOKALEMIA: ICD-10-CM

## 2025-07-27 DIAGNOSIS — A41.9 SEPSIS WITH ACUTE LIVER FAILURE WITHOUT HEPATIC COMA OR SEPTIC SHOCK, DUE TO UNSPECIFIED ORGANISM: ICD-10-CM

## 2025-07-27 DIAGNOSIS — K80.42 CHOLEDOCHOLITHIASIS WITH ACUTE CHOLECYSTITIS: Primary | ICD-10-CM

## 2025-07-27 DIAGNOSIS — E83.42 HYPOMAGNESEMIA: ICD-10-CM

## 2025-07-27 DIAGNOSIS — K72.00 SEPSIS WITH ACUTE LIVER FAILURE WITHOUT HEPATIC COMA OR SEPTIC SHOCK, DUE TO UNSPECIFIED ORGANISM: ICD-10-CM

## 2025-07-27 DIAGNOSIS — K85.10 ACUTE BILIARY PANCREATITIS, UNSPECIFIED COMPLICATION STATUS: ICD-10-CM

## 2025-07-27 LAB
ALBUMIN SERPL-MCNC: 3 G/DL (ref 3.5–5.2)
ALBUMIN/GLOB SERPL: 1.2 G/DL
ALP SERPL-CCNC: 247 U/L (ref 39–117)
ALT SERPL W P-5'-P-CCNC: 76 U/L (ref 1–41)
ANION GAP SERPL CALCULATED.3IONS-SCNC: 17.8 MMOL/L (ref 5–15)
AST SERPL-CCNC: 99 U/L (ref 1–40)
BASOPHILS # BLD AUTO: 0.05 10*3/MM3 (ref 0–0.2)
BASOPHILS NFR BLD AUTO: 0.3 % (ref 0–1.5)
BILIRUB SERPL-MCNC: 4.4 MG/DL (ref 0–1.2)
BUN SERPL-MCNC: 7 MG/DL (ref 8–23)
BUN/CREAT SERPL: 7.8 (ref 7–25)
CALCIUM SPEC-SCNC: 7.9 MG/DL (ref 8.6–10.5)
CHLORIDE SERPL-SCNC: 97 MMOL/L (ref 98–107)
CO2 SERPL-SCNC: 23.2 MMOL/L (ref 22–29)
CREAT SERPL-MCNC: 0.9 MG/DL (ref 0.76–1.27)
D-LACTATE SERPL-SCNC: 5.4 MMOL/L (ref 0.5–2)
DEPRECATED RDW RBC AUTO: 40.7 FL (ref 37–54)
EGFRCR SERPLBLD CKD-EPI 2021: 94.2 ML/MIN/1.73
EOSINOPHIL # BLD AUTO: 0 10*3/MM3 (ref 0–0.4)
EOSINOPHIL NFR BLD AUTO: 0 % (ref 0.3–6.2)
ERYTHROCYTE [DISTWIDTH] IN BLOOD BY AUTOMATED COUNT: 12.6 % (ref 12.3–15.4)
ETHANOL BLD-MCNC: <10 MG/DL (ref 0–10)
ETHANOL UR QL: <0.01 %
GLOBULIN UR ELPH-MCNC: 2.5 GM/DL
GLUCOSE SERPL-MCNC: 137 MG/DL (ref 65–99)
HCT VFR BLD AUTO: 42.5 % (ref 37.5–51)
HGB BLD-MCNC: 15.2 G/DL (ref 13–17.7)
HOLD SPECIMEN: NORMAL
HOLD SPECIMEN: NORMAL
IMM GRANULOCYTES # BLD AUTO: 0.09 10*3/MM3 (ref 0–0.05)
IMM GRANULOCYTES NFR BLD AUTO: 0.5 % (ref 0–0.5)
LIPASE SERPL-CCNC: 755 U/L (ref 13–60)
LYMPHOCYTES # BLD AUTO: 0.36 10*3/MM3 (ref 0.7–3.1)
LYMPHOCYTES NFR BLD AUTO: 2 % (ref 19.6–45.3)
MAGNESIUM SERPL-MCNC: 1.1 MG/DL (ref 1.6–2.4)
MCH RBC QN AUTO: 31.2 PG (ref 26.6–33)
MCHC RBC AUTO-ENTMCNC: 35.8 G/DL (ref 31.5–35.7)
MCV RBC AUTO: 87.3 FL (ref 79–97)
MONOCYTES # BLD AUTO: 0.86 10*3/MM3 (ref 0.1–0.9)
MONOCYTES NFR BLD AUTO: 4.8 % (ref 5–12)
NEUTROPHILS NFR BLD AUTO: 16.44 10*3/MM3 (ref 1.7–7)
NEUTROPHILS NFR BLD AUTO: 92.4 % (ref 42.7–76)
NRBC BLD AUTO-RTO: 0 /100 WBC (ref 0–0.2)
NT-PROBNP SERPL-MCNC: 5240 PG/ML (ref 0–900)
PLATELET # BLD AUTO: 205 10*3/MM3 (ref 140–450)
PMV BLD AUTO: 10.1 FL (ref 6–12)
POTASSIUM SERPL-SCNC: 2.7 MMOL/L (ref 3.5–5.2)
PROCALCITONIN SERPL-MCNC: 10.32 NG/ML (ref 0–0.25)
PROT SERPL-MCNC: 5.5 G/DL (ref 6–8.5)
RBC # BLD AUTO: 4.87 10*6/MM3 (ref 4.14–5.8)
SODIUM SERPL-SCNC: 138 MMOL/L (ref 136–145)
TROPONIN T SERPL HS-MCNC: 28 NG/L
WBC NRBC COR # BLD AUTO: 17.8 10*3/MM3 (ref 3.4–10.8)
WHOLE BLOOD HOLD COAG: NORMAL
WHOLE BLOOD HOLD SPECIMEN: NORMAL

## 2025-07-27 PROCEDURE — 74177 CT ABD & PELVIS W/CONTRAST: CPT

## 2025-07-27 PROCEDURE — 25010000002 MORPHINE PER 10 MG: Performed by: STUDENT IN AN ORGANIZED HEALTH CARE EDUCATION/TRAINING PROGRAM

## 2025-07-27 PROCEDURE — 83735 ASSAY OF MAGNESIUM: CPT | Performed by: STUDENT IN AN ORGANIZED HEALTH CARE EDUCATION/TRAINING PROGRAM

## 2025-07-27 PROCEDURE — 84484 ASSAY OF TROPONIN QUANT: CPT | Performed by: STUDENT IN AN ORGANIZED HEALTH CARE EDUCATION/TRAINING PROGRAM

## 2025-07-27 PROCEDURE — 87186 SC STD MICRODIL/AGAR DIL: CPT | Performed by: STUDENT IN AN ORGANIZED HEALTH CARE EDUCATION/TRAINING PROGRAM

## 2025-07-27 PROCEDURE — 82077 ASSAY SPEC XCP UR&BREATH IA: CPT | Performed by: STUDENT IN AN ORGANIZED HEALTH CARE EDUCATION/TRAINING PROGRAM

## 2025-07-27 PROCEDURE — 87040 BLOOD CULTURE FOR BACTERIA: CPT | Performed by: STUDENT IN AN ORGANIZED HEALTH CARE EDUCATION/TRAINING PROGRAM

## 2025-07-27 PROCEDURE — 80053 COMPREHEN METABOLIC PANEL: CPT | Performed by: STUDENT IN AN ORGANIZED HEALTH CARE EDUCATION/TRAINING PROGRAM

## 2025-07-27 PROCEDURE — 99285 EMERGENCY DEPT VISIT HI MDM: CPT

## 2025-07-27 PROCEDURE — 84145 PROCALCITONIN (PCT): CPT | Performed by: STUDENT IN AN ORGANIZED HEALTH CARE EDUCATION/TRAINING PROGRAM

## 2025-07-27 PROCEDURE — 87154 CUL TYP ID BLD PTHGN 6+ TRGT: CPT | Performed by: STUDENT IN AN ORGANIZED HEALTH CARE EDUCATION/TRAINING PROGRAM

## 2025-07-27 PROCEDURE — 83605 ASSAY OF LACTIC ACID: CPT | Performed by: STUDENT IN AN ORGANIZED HEALTH CARE EDUCATION/TRAINING PROGRAM

## 2025-07-27 PROCEDURE — 25810000003 LACTATED RINGERS SOLUTION: Performed by: STUDENT IN AN ORGANIZED HEALTH CARE EDUCATION/TRAINING PROGRAM

## 2025-07-27 PROCEDURE — 85025 COMPLETE CBC W/AUTO DIFF WBC: CPT | Performed by: STUDENT IN AN ORGANIZED HEALTH CARE EDUCATION/TRAINING PROGRAM

## 2025-07-27 PROCEDURE — 93005 ELECTROCARDIOGRAM TRACING: CPT | Performed by: STUDENT IN AN ORGANIZED HEALTH CARE EDUCATION/TRAINING PROGRAM

## 2025-07-27 PROCEDURE — 83880 ASSAY OF NATRIURETIC PEPTIDE: CPT | Performed by: STUDENT IN AN ORGANIZED HEALTH CARE EDUCATION/TRAINING PROGRAM

## 2025-07-27 PROCEDURE — 83690 ASSAY OF LIPASE: CPT | Performed by: STUDENT IN AN ORGANIZED HEALTH CARE EDUCATION/TRAINING PROGRAM

## 2025-07-27 PROCEDURE — 99291 CRITICAL CARE FIRST HOUR: CPT | Performed by: STUDENT IN AN ORGANIZED HEALTH CARE EDUCATION/TRAINING PROGRAM

## 2025-07-27 PROCEDURE — 71275 CT ANGIOGRAPHY CHEST: CPT

## 2025-07-27 PROCEDURE — 96375 TX/PRO/DX INJ NEW DRUG ADDON: CPT

## 2025-07-27 PROCEDURE — 87077 CULTURE AEROBIC IDENTIFY: CPT | Performed by: STUDENT IN AN ORGANIZED HEALTH CARE EDUCATION/TRAINING PROGRAM

## 2025-07-27 PROCEDURE — 71045 X-RAY EXAM CHEST 1 VIEW: CPT

## 2025-07-27 RX ORDER — MORPHINE SULFATE 2 MG/ML
2 INJECTION, SOLUTION INTRAMUSCULAR; INTRAVENOUS ONCE
Status: COMPLETED | OUTPATIENT
Start: 2025-07-27 | End: 2025-07-27

## 2025-07-27 RX ORDER — SODIUM CHLORIDE 0.9 % (FLUSH) 0.9 %
10 SYRINGE (ML) INJECTION AS NEEDED
Status: DISCONTINUED | OUTPATIENT
Start: 2025-07-27 | End: 2025-07-28 | Stop reason: HOSPADM

## 2025-07-27 RX ADMIN — SODIUM CHLORIDE, POTASSIUM CHLORIDE, SODIUM LACTATE AND CALCIUM CHLORIDE 1000 ML: 600; 310; 30; 20 INJECTION, SOLUTION INTRAVENOUS at 23:23

## 2025-07-27 RX ADMIN — MORPHINE SULFATE 2 MG: 2 INJECTION, SOLUTION INTRAMUSCULAR; INTRAVENOUS at 23:23

## 2025-07-28 VITALS
HEART RATE: 93 BPM | DIASTOLIC BLOOD PRESSURE: 84 MMHG | SYSTOLIC BLOOD PRESSURE: 109 MMHG | RESPIRATION RATE: 18 BRPM | HEIGHT: 75 IN | BODY MASS INDEX: 31.08 KG/M2 | TEMPERATURE: 98.4 F | WEIGHT: 250 LBS | OXYGEN SATURATION: 92 %

## 2025-07-28 LAB
BACTERIA BLD CULT: ABNORMAL
BOTTLE TYPE: ABNORMAL
D-LACTATE SERPL-SCNC: 4.2 MMOL/L (ref 0.5–2)
GEN 5 1HR TROPONIN T REFLEX: 29 NG/L
TROPONIN T % DELTA: 4
TROPONIN T NUMERIC DELTA: 1 NG/L

## 2025-07-28 PROCEDURE — 25010000002 PIPERACILLIN SOD-TAZOBACTAM PER 1 G: Performed by: STUDENT IN AN ORGANIZED HEALTH CARE EDUCATION/TRAINING PROGRAM

## 2025-07-28 PROCEDURE — 96368 THER/DIAG CONCURRENT INF: CPT

## 2025-07-28 PROCEDURE — 96367 TX/PROPH/DG ADDL SEQ IV INF: CPT

## 2025-07-28 PROCEDURE — 25510000001 IOPAMIDOL 61 % SOLUTION: Performed by: STUDENT IN AN ORGANIZED HEALTH CARE EDUCATION/TRAINING PROGRAM

## 2025-07-28 PROCEDURE — 25010000002 MAGNESIUM SULFATE IN D5W 1G/100ML (PREMIX) 1-5 GM/100ML-% SOLUTION: Performed by: STUDENT IN AN ORGANIZED HEALTH CARE EDUCATION/TRAINING PROGRAM

## 2025-07-28 PROCEDURE — 25810000003 LACTATED RINGERS SOLUTION: Performed by: STUDENT IN AN ORGANIZED HEALTH CARE EDUCATION/TRAINING PROGRAM

## 2025-07-28 PROCEDURE — 96365 THER/PROPH/DIAG IV INF INIT: CPT

## 2025-07-28 PROCEDURE — 25010000002 MORPHINE PER 10 MG: Performed by: STUDENT IN AN ORGANIZED HEALTH CARE EDUCATION/TRAINING PROGRAM

## 2025-07-28 PROCEDURE — 25010000002 POTASSIUM CHLORIDE 10 MEQ/100ML SOLUTION: Performed by: STUDENT IN AN ORGANIZED HEALTH CARE EDUCATION/TRAINING PROGRAM

## 2025-07-28 PROCEDURE — 83605 ASSAY OF LACTIC ACID: CPT | Performed by: STUDENT IN AN ORGANIZED HEALTH CARE EDUCATION/TRAINING PROGRAM

## 2025-07-28 PROCEDURE — 96376 TX/PRO/DX INJ SAME DRUG ADON: CPT

## 2025-07-28 RX ORDER — POTASSIUM CHLORIDE 7.45 MG/ML
10 INJECTION INTRAVENOUS ONCE
Status: COMPLETED | OUTPATIENT
Start: 2025-07-28 | End: 2025-07-28

## 2025-07-28 RX ORDER — MAGNESIUM SULFATE 1 G/100ML
1 INJECTION INTRAVENOUS ONCE
Status: COMPLETED | OUTPATIENT
Start: 2025-07-28 | End: 2025-07-28

## 2025-07-28 RX ORDER — IOPAMIDOL 612 MG/ML
100 INJECTION, SOLUTION INTRAVASCULAR
Status: COMPLETED | OUTPATIENT
Start: 2025-07-28 | End: 2025-07-28

## 2025-07-28 RX ADMIN — MAGNESIUM SULFATE HEPTAHYDRATE 1 G: 1 INJECTION, SOLUTION INTRAVENOUS at 01:31

## 2025-07-28 RX ADMIN — IOPAMIDOL 100 ML: 612 INJECTION, SOLUTION INTRAVENOUS at 00:05

## 2025-07-28 RX ADMIN — MORPHINE SULFATE 4 MG: 4 INJECTION, SOLUTION INTRAMUSCULAR; INTRAVENOUS at 00:24

## 2025-07-28 RX ADMIN — PIPERACILLIN AND TAZOBACTAM 3.38 G: 3; .375 INJECTION, POWDER, FOR SOLUTION INTRAVENOUS at 00:24

## 2025-07-28 RX ADMIN — SODIUM CHLORIDE, POTASSIUM CHLORIDE, SODIUM LACTATE AND CALCIUM CHLORIDE 1000 ML: 600; 310; 30; 20 INJECTION, SOLUTION INTRAVENOUS at 01:30

## 2025-07-28 RX ADMIN — POTASSIUM CHLORIDE 10 MEQ: 7.46 INJECTION, SOLUTION INTRAVENOUS at 01:30

## 2025-07-28 NOTE — ED NOTES
Called [facility] for [facility], spoke with Kylah who advised patient may be placed on wait list.  Waiting on return call, provider notified.

## 2025-07-28 NOTE — ED NOTES
[place] [facility] [place] [facility] [place].

## 2025-07-28 NOTE — ED PROVIDER NOTES
"        [facility] EMERGENCY DEPARTMENT  Emergency Department Encounter  Emergency Medicine Physician Note       Pt Name: Stephane Jensen  MRN: [id number]  Pt :   1960  Room Number:    Date of encounter:  2025  PCP: Ilana Clarke, BAUDILIO  ED Provider: Sj Yun MD    Historian: Patient      HPI:  Chief Complaint: Chest pain        Context: Stephane Jensen is a 65 y.o. male who presents to the ED c/o chest pain.  Patient states he has had right-sided chest pain for the past week since last Tuesday and was seen in emergency department at [facility], and discharged after being diagnosed with low potassium levels.  States he has not gotten any better despite taking the prescribed potassium pills.  Also reports diarrhea.  Denies any abdominal pain.  Denies any vomiting.  Unsure of any fevers.  States he just feels \"bad\".  States he has been very tired and even fell asleep during exam.      PAST MEDICAL HISTORY  Past Medical History:   Diagnosis Date    Arthritis     COPD (chronic obstructive pulmonary disease)     Hypertension     skin cancer     Sleep apnea          PAST SURGICAL HISTORY  Past Surgical History:   Procedure Laterality Date    ADENOIDECTOMY           FAMILY HISTORY  History reviewed. No pertinent family history.      SOCIAL HISTORY  Social History     Socioeconomic History    Marital status:    Tobacco Use    Smoking status: Every Day     Current packs/day: 2.00     Types: Cigarettes    Smokeless tobacco: Never   Vaping Use    Vaping status: Never Used   Substance and Sexual Activity    Alcohol use: No     Comment: quit 20 years ago    Drug use: Yes     Types: Marijuana     Comment: quit suboxone 2 years ago    Sexual activity: Defer         ALLERGIES  Corticosteroids and Nsaids        REVIEW OF SYSTEMS  Review of Systems     All systems reviewed and negative except for those discussed in HPI.       PHYSICAL EXAM    I have reviewed the triage vital signs and nursing " notes.    ED Triage Vitals [07/27/25 2251]   Temp Heart Rate Resp BP SpO2   98.4 °F (36.9 °C) 98 18 (!) 142/121 93 %      Temp src Heart Rate Source Patient Position BP Location FiO2 (%)   Oral Monitor Sitting Left arm --       Physical Exam    General:  Awake, alert, appears older than stated age, ill-appearing but no acute distress  HEENT: Atraumatic, normocephalic, EOMI, PERRLA, mucous membranes moist  NECK:  Supple, atraumatic  Cardiovascular:  Regular rate, regular rhythm, no murmurs, rubs, or gallops.  Extremities well perfused   Respiratory: Reduced lung sounds on right lower lung compared to left.  No significant wheezing.  Abdominal:  Soft, nondistended, nontender.  No guarding or rebound.  No palpable masses  Extremity:  No visible bony abnormalities in all 4 extremities.  Full range of motion of all extremities.  Skin:  Warm and dry.  No rashes  Neuro:  AAOx3, GCS 15. Cranial nerves 2-12 grossly intact.  No focal strength or sensation deficits.  Psych:  Mood and affect appropriate.        LAB RESULTS  Recent Results (from the past 24 hours)   Comprehensive Metabolic Panel    Collection Time: 07/27/25 10:56 PM    Specimen: Blood   Result Value Ref Range    Glucose 137 (H) 65 - 99 mg/dL    BUN 7.0 (L) 8.0 - 23.0 mg/dL    Creatinine 0.90 0.76 - 1.27 mg/dL    Sodium 138 136 - 145 mmol/L    Potassium 2.7 (L) 3.5 - 5.2 mmol/L    Chloride 97 (L) 98 - 107 mmol/L    CO2 23.2 22.0 - 29.0 mmol/L    Calcium 7.9 (L) 8.6 - 10.5 mg/dL    Total Protein 5.5 (L) 6.0 - 8.5 g/dL    Albumin 3.0 (L) 3.5 - 5.2 g/dL    ALT (SGPT) 76 (H) 1 - 41 U/L    AST (SGOT) 99 (H) 1 - 40 U/L    Alkaline Phosphatase 247 (H) 39 - 117 U/L    Total Bilirubin 4.4 (H) 0.0 - 1.2 mg/dL    Globulin 2.5 gm/dL    A/G Ratio 1.2 g/dL    BUN/Creatinine Ratio 7.8 7.0 - 25.0    Anion Gap 17.8 (H) 5.0 - 15.0 mmol/L    eGFR 94.2 >60.0 mL/min/1.73   Lipase    Collection Time: 07/27/25 10:56 PM    Specimen: Blood   Result Value Ref Range    Lipase 755 (H) 13 -  60 U/L   Lactic Acid, Plasma    Collection Time: 07/27/25 10:56 PM    Specimen: Blood   Result Value Ref Range    Lactate 5.4 (C) 0.5 - 2.0 mmol/L   Green Top (Gel)    Collection Time: 07/27/25 10:56 PM   Result Value Ref Range    Extra Tube Hold for add-ons.    Lavender Top    Collection Time: 07/27/25 10:56 PM   Result Value Ref Range    Extra Tube hold for add-on    Gold Top - SST    Collection Time: 07/27/25 10:56 PM   Result Value Ref Range    Extra Tube Hold for add-ons.    Light Blue Top    Collection Time: 07/27/25 10:56 PM   Result Value Ref Range    Extra Tube Hold for add-ons.    CBC Auto Differential    Collection Time: 07/27/25 10:56 PM    Specimen: Blood   Result Value Ref Range    WBC 17.80 (H) 3.40 - 10.80 10*3/mm3    RBC 4.87 4.14 - 5.80 10*6/mm3    Hemoglobin 15.2 13.0 - 17.7 g/dL    Hematocrit 42.5 37.5 - 51.0 %    MCV 87.3 79.0 - 97.0 fL    MCH 31.2 26.6 - 33.0 pg    MCHC 35.8 (H) 31.5 - 35.7 g/dL    RDW 12.6 12.3 - 15.4 %    RDW-SD 40.7 37.0 - 54.0 fl    MPV 10.1 6.0 - 12.0 fL    Platelets 205 140 - 450 10*3/mm3    Neutrophil % 92.4 (H) 42.7 - 76.0 %    Lymphocyte % 2.0 (L) 19.6 - 45.3 %    Monocyte % 4.8 (L) 5.0 - 12.0 %    Eosinophil % 0.0 (L) 0.3 - 6.2 %    Basophil % 0.3 0.0 - 1.5 %    Immature Grans % 0.5 0.0 - 0.5 %    Neutrophils, Absolute 16.44 (H) 1.70 - 7.00 10*3/mm3    Lymphocytes, Absolute 0.36 (L) 0.70 - 3.10 10*3/mm3    Monocytes, Absolute 0.86 0.10 - 0.90 10*3/mm3    Eosinophils, Absolute 0.00 0.00 - 0.40 10*3/mm3    Basophils, Absolute 0.05 0.00 - 0.20 10*3/mm3    Immature Grans, Absolute 0.09 (H) 0.00 - 0.05 10*3/mm3    nRBC 0.0 0.0 - 0.2 /100 WBC   Procalcitonin    Collection Time: 07/27/25 10:56 PM    Specimen: Blood   Result Value Ref Range    Procalcitonin 10.32 (H) 0.00 - 0.25 ng/mL   High Sensitivity Troponin T    Collection Time: 07/27/25 10:56 PM    Specimen: Blood   Result Value Ref Range    HS Troponin T 28 (H) <22 ng/L   BNP    Collection Time: 07/27/25 10:56 PM     Specimen: Blood   Result Value Ref Range    proBNP 5,240.0 (H) 0.0 - 900.0 pg/mL   Ethanol    Collection Time: 07/27/25 10:56 PM    Specimen: Blood   Result Value Ref Range    Ethanol <10 0 - 10 mg/dL    Ethanol % <0.010 %   Magnesium    Collection Time: 07/27/25 10:56 PM    Specimen: Blood   Result Value Ref Range    Magnesium 1.1 (L) 1.6 - 2.4 mg/dL   High Sensitivity Troponin T 1Hr    Collection Time: 07/27/25 11:40 PM    Specimen: Blood   Result Value Ref Range    HS Troponin T 29 (H) <22 ng/L    Troponin T Numeric Delta 1 ng/L    Troponin T % Delta 4 Abnormal if >/= 20%   STAT Lactic Acid, Reflex    Collection Time: 07/28/25  2:07 AM    Specimen: Blood   Result Value Ref Range    Lactate 4.2 (C) 0.5 - 2.0 mmol/L       If labs were ordered, I independently evaluated the results and considered them in treating the patient.        RADIOLOGY  CT Angiogram Chest Pulmonary Embolism  Result Date: 7/28/2025  FINAL REPORT TECHNIQUE: null CLINICAL HISTORY: Right-sided chest pain and tachycardia along with right-sided upper abdominal pain COMPARISON: null FINDINGS: CT angiography chest with contrast. 3D Postprocessing. Comparison: CT/OT - CT CHEST WO CONTRAST - 01/24/2020 01:35 PM EST ,CT/OT - CT ANGIOGRAM CHEST PULMONARY EMBOLISM - 12/11/19 14:41 EST Findings: Normal heart size. No pericardial effusion. Normal caliber thoracic aorta. No findings of aortic dissection. No threshold enlarged thoracic lymph node by CT size criteria. Normal diameter main pulmonary trunk. No pulmonary artery filling defect. Lungs and pleural spaces clear. No acute or suspicious bone finding.     IMPRESSION: 1. No pulmonary embolism or other acute finding in the chest. Authenticated and Electronically Signed by Kulwant Whatley on 07/28/2025 12:51:29 AM    CT Abdomen Pelvis With Contrast  Result Date: 7/28/2025  FINAL REPORT TECHNIQUE: null CLINICAL HISTORY: Right-sided lower chest pain and upper abdominal pain COMPARISON: null FINDINGS: CT  abdomen and pelvis with contrast Comparison: CT/OT/SR - CT ABDOMEN PELVIS W CONTRAST - 6/20/16 20:37 EDT Findings: Dilated common duct measuring up to 1.2 cm. Trace intrahepatic biliary ductal dilatation. Moderately distended gallbladder. No gallbladder wall thickening or pericholecystic fluid. Mild fat stranding in the karley hepatis. Fatty atrophy of the pancreas. No pancreatic ductal dilatation. Normal liver, spleen, and adrenal glands. Nonobstructing left lower pole renal calculus measuring 6 mm. No other significant abnormality of the kidneys, ureters, or urinary bladder. No intraperitoneal free fluid or free air. Normal stomach and bowel. No acute fracture. Lung bases clear.     IMPRESSION: Moderately distended gallbladder with dilated bile ducts along with mild courtney-portal fat stranding. No cholelithiasis or choledocholithiasis demonstrated by CT. MRCP could be considered for further evaluation. Differential considerations include biliary tract obstruction versus less likely cholecystitis or cholangitis. Authenticated and Electronically Signed by Kulwant Whatley on 07/28/2025 12:50:33 AM      I ordered and independently evaluated the above noted radiographic studies.     See radiologist's dictation for official interpretation.        PROCEDURES    Critical Care    Performed by: Sj Yun MD  Authorized by: Sj Yun MD    Critical care provider statement:     Critical care time (minutes):  45    Critical care time was exclusive of:  Separately billable procedures and treating other patients and teaching time    Critical care was necessary to treat or prevent imminent or life-threatening deterioration of the following conditions:  Sepsis, shock, circulatory failure and hepatic failure    Critical care was time spent personally by me on the following activities:  Development of treatment plan with patient or surrogate, discussions with consultants, evaluation of patient's response to treatment,  examination of patient, obtaining history from patient or surrogate, re-evaluation of patient's condition, ordering and review of radiographic studies, ordering and review of laboratory studies, ordering and performing treatments and interventions and review of old charts    I assumed direction of critical care for this patient from another provider in my specialty: no      Care discussed with: accepting provider at another facility        ECG 12 Lead Other; epigastric pain   Final Result          MEDICATIONS GIVEN IN ER    Medications   sodium chloride 0.9 % flush 10 mL (has no administration in time range)   lactated ringers bolus 1,000 mL (0 mL Intravenous Stopped 7/28/25 0025)   Morphine sulfate (PF) injection 2 mg (2 mg Intravenous Given 7/27/25 2323)   iopamidol (ISOVUE-300) 61 % injection 100 mL (100 mL Intravenous Given 7/28/25 0005)   morphine injection 4 mg (4 mg Intravenous Given 7/28/25 0024)   piperacillin-tazobactam (ZOSYN) IVPB 3.375 g IVPB in 100 mL NS (VTB) (0 g Intravenous Stopped 7/28/25 0129)   magnesium sulfate in D5W 1g/100mL (PREMIX) (0 g Intravenous Stopped 7/28/25 0316)   potassium chloride 10 mEq in 100 mL IVPB (0 mEq Intravenous Stopped 7/28/25 0316)   lactated ringers bolus 1,000 mL (0 mL Intravenous Stopped 7/28/25 0317)         MEDICAL DECISION MAKING, PROGRESS, and CONSULTS    All labs, if obtained, have been independently reviewed by me.  All radiology studies, if obtained, have been evaluated by me and the radiologist dictating the report.  All EKG's, if obtained, have been independently viewed and interpreted by me.      Discussion below represents my analysis of pertinent findings related to patient's condition, differential diagnosis, treatment plan and final disposition.    Stephane Jensen is a 65 y.o. male who presents to the ED c/o chest pain.  Brought in by EMS.  Hemodynamically stable on arrival.  Awake alert and oriented x 4 GCS 15 although patient did fall sleep once  during exam.  Very tired appearing.  Differential includes was not limited to pneumonia, pneumothorax, pulmonary edema, pleural effusion, myocardial infarction, aortic dissection, pulmonary embolism, electrolyte imbalance, arrhythmia.  Extensive labs along with EKG ordered.  Patient given IV morphine for pain control.    Patient has significant leukocytosis of 17.8 increasing concern for possible sepsis so blood cultures ordered along with serum lactate and procalcitonin level.  Patient also empirically given 1 L bolus of lactated ringer IV fluids.    EKG obtained which was personally interpreted showing mildly tachycardic sinus rhythm with rate of 100 with right bundle branch block but no evidence of acute ischemic change/STEMI.    Labs show significant leukocytosis of 17.8.  No significant anemia.  Multiple electrolyte imbalances including severe hypokalemia of 2.7 along with mildly low calcium of 7.9.  Also evidence of LFT elevations with ALT of 76, AST of 99 and significantly elevated total bilirubin of 4.4.  Significant elevated lipase of 755 concerning for pancreatitis.  Mildly elevated initial troponin of 28 however repeat troponin did not have significant delta change and was only 29.  Patient also has severe hypomagnesemia of 1.1.  Started on IV magnesium and IV potassium replacement.  Elevated procalcitonin level of 10.3 and significantly elevated lactate of 5.4.  High concern for sepsis so patient was empirically started on IV Zosyn and received 2 L of lactated ringer IV fluids.    CT pulmonary embolism study obtained which was personally visualized and negative for pulmonary embolism or dissection.  CT abdomen pelvis also obtained and reportedly shows moderately distended gallbladder with dilated bile ducts along with periportal fat stranding but no visualized gallstones.  Given lab changes along with dilated bile ducts and distended gallbladder high concern for choledocholithiasis with  pancreatitis.    No ERCP capability at our facility at this time so transfer center was contacted at  and was told that they are currently on diversion and unable to accept patient for transfer.    Spoke with call center and nurse practitioner on-call for Dr. Jamee Yang at [facility] in [place] who accepted patient for transfer for further treatment.                           Orders placed during this visit:  Orders Placed This Encounter   Procedures    Critical Care    Blood Culture - Blood,    Blood Culture - Blood,    XR Chest 1 View    CT Abdomen Pelvis With Contrast    CT Angiogram Chest Pulmonary Embolism    [place] Draw    Comprehensive Metabolic Panel    Lipase    Urinalysis With Microscopic If Indicated (No Culture) - Urine, Clean Catch    Lactic Acid, Plasma    CBC Auto Differential    Procalcitonin    High Sensitivity Troponin T    BNP    Ethanol    Urine Drug Screen - Urine, Clean Catch    STAT Lactic Acid, Reflex    Magnesium    High Sensitivity Troponin T 1Hr    STAT Lactic Acid, Reflex    NPO Diet NPO Type: Strict NPO    Undress & Gown    ECG 12 Lead Other; epigastric pain    Insert Peripheral IV    CBC & Differential    Green Top (Gel)    Lavender Top    Gold Top - SST    Light Blue Top         ED Course:    ED Course as of 07/28/25 0354   Mon Jul 28, 2025   0133 Contacted  transfer center and spoke with Dr. Ceferino Gamez who reports they are currently on diversion status and unable to accept patient despite his septic status and need for emergent ERCP and likely cholecystectomy. [FL]   0213 Spoke with nurse practitioner on-call for transfer at [facility] in [place] who accepted patient for transfer pending bed availability likely this morning [FL]      ED Course User Index  [FL] Sj Yun MD              Shared Decision Making:  After my consideration of clinical presentation and any laboratory/radiology studies obtained, I discussed the findings with the  patient/patient representative who is in agreement with the treatment plan and the final disposition.   Risks and benefits of discharge and/or observation/admission were discussed.      AS OF 03:54 EDT VITALS:    BP - 109/84  HR - 93  TEMP - 98.4 °F (36.9 °C) (Oral)  O2 SATS - 92%                  DIAGNOSIS  Final diagnoses:   Choledocholithiasis with acute cholecystitis   Acute biliary pancreatitis, unspecified complication status   Sepsis with acute liver failure without hepatic coma or septic shock, due to unspecified organism   Hypokalemia   Hypomagnesemia         DISPOSITION  Transfer      Please note that portions of this document were completed with voice recognition software.        Sj Yun MD  07/28/25 0353

## 2025-07-28 NOTE — ED NOTES
Called [facility] for possible transfer, spoke to Marline, patient may be placed on wait list.  Waiting on return call, provider notified.

## 2025-07-30 LAB
BACTERIA SPEC AEROBE CULT: ABNORMAL
BACTERIA SPEC AEROBE CULT: ABNORMAL
GRAM STN SPEC: ABNORMAL
GRAM STN SPEC: ABNORMAL
ISOLATED FROM: ABNORMAL
ISOLATED FROM: ABNORMAL